# Patient Record
Sex: FEMALE | Race: WHITE | NOT HISPANIC OR LATINO | Employment: FULL TIME | URBAN - METROPOLITAN AREA
[De-identification: names, ages, dates, MRNs, and addresses within clinical notes are randomized per-mention and may not be internally consistent; named-entity substitution may affect disease eponyms.]

---

## 2017-03-13 ENCOUNTER — HOSPITAL ENCOUNTER (EMERGENCY)
Facility: MEDICAL CENTER | Age: 32
End: 2017-03-13
Attending: EMERGENCY MEDICINE
Payer: MEDICAID

## 2017-03-13 VITALS
DIASTOLIC BLOOD PRESSURE: 68 MMHG | HEART RATE: 54 BPM | SYSTOLIC BLOOD PRESSURE: 114 MMHG | OXYGEN SATURATION: 99 % | RESPIRATION RATE: 16 BRPM | TEMPERATURE: 98.3 F | BODY MASS INDEX: 34.67 KG/M2 | WEIGHT: 220.9 LBS | HEIGHT: 67 IN

## 2017-03-13 DIAGNOSIS — G43.009 NONINTRACTABLE MIGRAINE, UNSPECIFIED MIGRAINE TYPE: ICD-10-CM

## 2017-03-13 PROCEDURE — A9270 NON-COVERED ITEM OR SERVICE: HCPCS | Performed by: EMERGENCY MEDICINE

## 2017-03-13 PROCEDURE — 99284 EMERGENCY DEPT VISIT MOD MDM: CPT

## 2017-03-13 PROCEDURE — 96374 THER/PROPH/DIAG INJ IV PUSH: CPT

## 2017-03-13 PROCEDURE — 700111 HCHG RX REV CODE 636 W/ 250 OVERRIDE (IP): Performed by: EMERGENCY MEDICINE

## 2017-03-13 PROCEDURE — 96375 TX/PRO/DX INJ NEW DRUG ADDON: CPT

## 2017-03-13 PROCEDURE — 700102 HCHG RX REV CODE 250 W/ 637 OVERRIDE(OP): Performed by: EMERGENCY MEDICINE

## 2017-03-13 PROCEDURE — 700105 HCHG RX REV CODE 258: Performed by: EMERGENCY MEDICINE

## 2017-03-13 RX ORDER — SODIUM CHLORIDE 9 MG/ML
1000 INJECTION, SOLUTION INTRAVENOUS ONCE
Status: COMPLETED | OUTPATIENT
Start: 2017-03-13 | End: 2017-03-13

## 2017-03-13 RX ORDER — DIPHENHYDRAMINE HYDROCHLORIDE 50 MG/ML
25 INJECTION INTRAMUSCULAR; INTRAVENOUS ONCE
Status: COMPLETED | OUTPATIENT
Start: 2017-03-13 | End: 2017-03-13

## 2017-03-13 RX ORDER — METOCLOPRAMIDE HYDROCHLORIDE 5 MG/ML
10 INJECTION INTRAMUSCULAR; INTRAVENOUS ONCE
Status: COMPLETED | OUTPATIENT
Start: 2017-03-13 | End: 2017-03-13

## 2017-03-13 RX ORDER — ACETAMINOPHEN 325 MG/1
650 TABLET ORAL ONCE
Status: COMPLETED | OUTPATIENT
Start: 2017-03-13 | End: 2017-03-13

## 2017-03-13 RX ADMIN — ACETAMINOPHEN 650 MG: 325 TABLET, FILM COATED ORAL at 15:39

## 2017-03-13 RX ADMIN — METOCLOPRAMIDE 10 MG: 5 INJECTION, SOLUTION INTRAMUSCULAR; INTRAVENOUS at 15:39

## 2017-03-13 RX ADMIN — SODIUM CHLORIDE 1000 ML: 9 INJECTION, SOLUTION INTRAVENOUS at 15:39

## 2017-03-13 RX ADMIN — DIPHENHYDRAMINE HYDROCHLORIDE 25 MG: 50 INJECTION INTRAMUSCULAR; INTRAVENOUS at 15:39

## 2017-03-13 ASSESSMENT — PAIN SCALES - GENERAL: PAINLEVEL_OUTOF10: 2

## 2017-03-13 NOTE — ED AVS SNAPSHOT
CatchThatBus Access Code: [email or web address]  Expires: 4/12/2017  4:36 PM    Your email address is not on file at [facility].  Email Addresses are required for you to sign up for CatchThatBus, please contact [phone] to verify your personal information and to provide your email address prior to attempting to register for CatchThatBus.    Sarita THAKKAR Machias  [address]  [place] [address]    [employer]  A secure, online tool to manage your health information     [facility]’s CatchThatBus® is a secure, online tool that connects you to your personalized health information from the privacy of your home -- day or night - making it very easy for you to manage your healthcare. Once the activation process is completed, you can even access your medical information using the CatchThatBus bjorn, which is available for free in the Apple Bjorn store or Google Play store.     To learn more about CatchThatBus, visit [email or web address]    There are two levels of access available (as shown below):   My Chart Features  [facility] Primary Care Doctor [facility]  Specialists [facility]  Urgent  Care Non-[facility] Primary Care Doctor   Email your healthcare team securely and privately 24/7 X X X    Manage appointments: schedule your next appointment; view details of past/upcoming appointments X      Request prescription refills. X      View recent personal medical records, including lab and immunizations X X X X   View health record, including health history, allergies, medications X X X X   Read reports about your outpatient visits, procedures, consult and ER notes X X X X   See your discharge summary, which is a recap of your hospital and/or ER visit that includes your diagnosis, lab results, and care plan X X  X     How to register for CatchThatBus:  Once your e-mail address has been verified, follow the following steps to sign up for CatchThatBus.     1. Go to  [email or web address]  2. Click on the Sign Up Now box, which takes you to the New Member Sign Up page.  You will need to provide the following information:  a. Enter your EVIIVO Access Code exactly as it appears at the top of this page. (You will not need to use this code after you’ve completed the sign-up process. If you do not sign up before the expiration date, you must request a new code.)   b. Enter your date of birth.   c. Enter your home email address.   d. Click Submit, and follow the next screen’s instructions.  3. Create a Taplistert ID. This will be your EVIIVO login ID and cannot be changed, so think of one that is secure and easy to remember.  4. Create a EVIIVO password. You can change your password at any time.  5. Enter your Password Reset Question and Answer. This can be used at a later time if you forget your password.   6. Enter your e-mail address. This allows you to receive e-mail notifications when new information is available in EVIIVO.  7. Click Sign Up. You can now view your health information.    For assistance activating your EVIIVO account, call [phone]

## 2017-03-13 NOTE — ED NOTES
Pt AAOx4 with complaints of headache pain. No reports of difficulty breathing or SOB. No signs of distress or discomfort. Ambulatory with no assistance. Gurney in lowest position, locked, and call light within reach.

## 2017-03-13 NOTE — ED PROVIDER NOTES
ED Provider Note    CHIEF COMPLAINT  Chief Complaint   Patient presents with   • Head Ache   • Weakness       HPI  Sarita Mendoza is a 31 y.o. female who presents for evaluation of headache, light sensitivity sound sensitivity mild nausea. The patient apparently has a history of migraine-type headaches. Her psychiatrist started her on Topamax about 6 months ago and she reports that her migraine somewhat went away. She has not changed her Topamax dose recently but developed which she describes as pretty typical migraine over the last few days. Took some ibuprofen with no relief. She specifically denies any head trauma fevers chills headache numbness weakness tingling to the arms or legs or face, incontinence, or double vision. No other complaints noted       REVIEW OF SYSTEMS  See HPI for further details. No high fevers chills  Stiffness rash All other systems are negative.     PAST MEDICAL HISTORY  Past Medical History   Diagnosis Date   • Migraine        FAMILY HISTORY  No history of brain aneurysm    SOCIAL HISTORY  Social History     Social History   • Marital Status: Single     Spouse Name: N/A   • Number of Children: N/A   • Years of Education: N/A     Social History Main Topics   • Smoking status: Current Every Day Smoker -- 1.00 packs/day for 17 years     Types: Cigarettes   • Smokeless tobacco: Never Used   • Alcohol Use: No      Comment: quit   • Drug Use: No      Comment: quit   • Sexual Activity: Not Asked     Other Topics Concern   • None     Social History Narrative    ** Merged History Encounter **         smoke cigarettes denies illicit drug use    SURGICAL HISTORY  Past Surgical History   Procedure Laterality Date   • Tonsillectomy and adenoidectomy     • Primary c section       2005, 2007   • Gyn surgery       2 c sect       CURRENT MEDICATIONS    Current facility-administered medications:   •  NS infusion 1,000 mL, 1,000 mL, Intravenous, Once, Bernardino Stevens M.D.  •  metoclopramide (REGLAN)  "injection 10 mg, 10 mg, Intravenous, Once, Bernardino Stevens M.D.  •  diphenhydrAMINE (BENADRYL) injection 25 mg, 25 mg, Intravenous, Once, Bernardino Stevens M.D.  •  acetaminophen (TYLENOL) tablet 650 mg, 650 mg, Oral, Once, Bernardino Stevens M.D.  No current outpatient prescriptions on file.      ALLERGIES  Allergies   Allergen Reactions   • Penicillins Rash   • Penicillins Rash       PHYSICAL EXAM  VITAL SIGNS: /68 mmHg  Pulse 62  Temp(Src) 36.8 °C (98.3 °F)  Resp 16  Ht 1.702 m (5' 7\")  Wt 100.2 kg (220 lb 14.4 oz)  BMI 34.59 kg/m2  SpO2 99%  LMP 03/06/2017 Room air O2: 99    Constitutional: Patient appears uncomfortable mild light sensitivity sound sensitivity  HENT: Normocephalic, Atraumatic, Bilateral external ears normal, Oropharynx moist, No oral exudates, Nose normal.   Eyes: PERRLA, EOMI, Conjunctiva normal, No discharge.   Neck: Normal range of motion, No tenderness, Supple, No stridor.   Cardiovascular: Normal heart rate, Normal rhythm, No murmurs, No rubs, No gallops.   Thorax & Lungs: Normal breath sounds, No respiratory distress, No wheezing, No chest tenderness.   Abdomen: Bowel sounds normal, Soft, No tenderness, No masses, No pulsatile masses.   Skin: Warm, Dry, No erythema, No rash.   Back: No tenderness, No CVA tenderness.   Extremities: Intact distal pulses, No edema, No tenderness, No cyanosis, No clubbing.   Neurologic: Alert & oriented x 3, Normal motor function, Normal sensory function, No focal deficits noted. Cranial nerves II through XII grossly intact finger-nose testing is normal no pronator drift  Psychiatric: Affect normal, Judgment normal, Mood normal.     COURSE & MEDICAL DECISION MAKING  Pertinent Labs & Imaging studies reviewed. (See chart for details)  An IV was established. Her history and physical exam here strongly suggest migraine type headache. Without any focal neurological deficit, abrupt onset headache, hypertension evidence of either rash or neck stiffness I low " suspicion for intracranial mass, hemorrhage, meningitis. An IV was established. She was given IV fluids Reglan and adrenaline Tylenol. I observed her for about 2 hours and headache dramatically improved. I recommended that she follow up with her PCP for ongoing management. Here and prior history of such headaches and did not feel that emergent CT scan was indicated     FINAL IMPRESSION  1. Migraine headache uncomplicated      Electronically signed by: Bernardino Stevens, 3/13/2017 3:11 PM

## 2017-03-13 NOTE — ED AVS SNAPSHOT
3/13/2017          Sarita THAKKAR Mendoza  [address]  [place] [address]    Dear Sarita:    [facility] wants to ensure your discharge home is safe and you or your loved ones have had all your questions answered regarding your care after you leave the hospital.    You may receive a telephone call within two days of your discharge.  This call is to make certain you understand your discharge instructions as well as ensure we provided you with the best care possible during your stay with us.     The call will only last approximately 3-5 minutes and will be done by a nurse.    Once again, we want to ensure your discharge home is safe and that you have a clear understanding of any next steps in your care.  If you have any questions or concerns, please do not hesitate to contact us, we are here for you.  Thank you for choosing [facility] for your healthcare needs.    Sincerely,    Walter Ellington    [facility]

## 2017-03-13 NOTE — ED AVS SNAPSHOT
Home Care Instructions                                                                                                                Sarita Mendoza   MRN: [id number]    Department:  [facility], Emergency Dept   Date of Visit:  3/13/2017            [facility], Emergency Dept    [address]    [place] [address]    Phone:  [phone]      You were seen by     Bernardino Stevens M.D.      Your Diagnosis Was     Nonintractable migraine, unspecified migraine type     G43.009       These are the medications you received during your hospitalization from 03/13/2017 1318 to 03/13/2017 1636     Date/Time Order Dose Route Action    03/13/2017 1539 NS infusion 1,000 mL 1,000 mL Intravenous New Bag    03/13/2017 1539 metoclopramide (REGLAN) injection 10 mg 10 mg Intravenous Given    03/13/2017 1539 diphenhydrAMINE (BENADRYL) injection 25 mg 25 mg Intravenous Given    03/13/2017 1539 acetaminophen (TYLENOL) tablet 650 mg 650 mg Oral Given      Follow-up Information     1. Follow up with Pcp Not In Computer.    Specialty:  Family Medicine    Why:  follow-up with your regular doctor in 1-2 days if symptoms progress or worsen      Medication Information     Review all of your home medications and newly ordered medications with your primary doctor and/or pharmacist as soon as possible. Follow medication instructions as directed by your doctor and/or pharmacist.     Please keep your complete medication list with you and share with your physician. Update the information when medications are discontinued, doses are changed, or new medications (including over-the-counter products) are added; and carry medication information at all times in the event of emergency situations.               Medication List      START taking these medications        Instructions    Morning Afternoon Evening Bedtime    acetaminophen-isometheptene-dichloralphonazone -325 MG Caps   Commonly known as:   MIDRIN        Take 1 Cap by mouth every four hours as needed.   Dose:  1 Cap                          ASK your doctor about these medications        Instructions    Morning Afternoon Evening Bedtime    TOPAMAX 200 MG tablet   Generic drug:  topiramate        Take 200 mg by mouth every bedtime.   Dose:  200 mg                             Where to Get Your Medications      You can get these medications from any pharmacy     Bring a paper prescription for each of these medications    - acetaminophen-isometheptene-dichloralphonazone -325 MG Caps            Procedures and tests performed during your visit     SALINE [facility]OCK        Discharge Instructions       Recurrent Migraine Headache  A migraine headache is very bad, throbbing pain on one or both sides of your head. Recurrent migraines keep coming back. Talk to your doctor about what things may bring on (trigger) your migraine headaches.  HOME CARE  · Only take medicines as told by your doctor.  · Lie down in a dark, quiet room when you have a migraine.  · Keep a journal to find out if certain things bring on migraine headaches. For example, write down:  ¨ What you eat and drink.  ¨ How much sleep you get.  ¨ Any change to your diet or medicines.  · Lessen how much alcohol you drink.  · Quit smoking if you smoke.  · Get enough sleep.  · Lessen any stress in your life.  · Keep lights dim if bright lights bother you or make your migraines worse.  GET HELP IF:  · Medicine does not help your migraines.  · Your pain keeps coming back.  · You have a fever.  GET HELP RIGHT AWAY IF:   · Your migraine becomes really bad.  · You have a stiff neck.  · You have trouble seeing.  · Your muscles are weak, or you lose muscle control.  · You lose your balance or have trouble walking.  · You feel like you will pass out (faint), or you pass out.  · You have really bad symptoms that are different than your first symptoms.  MAKE SURE YOU:   · Understand these instructions.  · Will  watch your condition.  · Will get help right away if you are not doing well or get worse.     This information is not intended to replace advice given to you by your health care provider. Make sure you discuss any questions you have with your health care provider.     Document Released: 09/26/2009 Document Revised: 12/23/2014 Document Reviewed: 08/25/2014  [employer] Interactive Patient Education ©2016 [employer]            Patient Information     Patient Information    Following emergency treatment: all patient requiring follow-up care must return either to a private physician or a clinic if your condition worsens before you are able to obtain further medical attention, please return to the emergency room.     Billing Information    At [facility], we work to make the billing process streamlined for our patients.  Our Representatives are here to answer any questions you may have regarding your hospital bill.  If you have insurance coverage and have supplied your insurance information to us, we will submit a claim to your insurer on your behalf.  Should you have any questions regarding your bill, we can be reached online or by phone as follows:  Online: You are able pay your bills online or live chat with our representatives about any billing questions you may have. We are here to help Monday - Friday from 8:00am to 7:30pm and 9:00am - 12:00pm on Saturdays.  Please visit [email or web address]  for more information.   Phone:  [phone] or [phone]    Please note that your emergency physician, surgeon, pathologist, radiologist, anesthesiologist, and other specialists are not employed by [facility] and will therefore bill separately for their services.  Please contact them directly for any questions concerning their bills at the numbers below:     [facility] Physician Services:  [phone]  [facility]:  [phone]  [facility]:   [phone]  [facility]:  [phone]    1. Your final bill may vary from the amount quoted upon discharge if all procedures are not complete at that time, or if your doctor has additional procedures of which we are not aware. You will receive an additional bill if you return to the Emergency Department at [facility] for suture removal regardless of the facility of which the sutures were placed.     2. Please arrange for settlement of this account at the emergency registration.    3. All self-pay accounts are due in full at the time of treatment.  If you are unable to meet this obligation then payment is expected within 4-5 days.     4. If you have had radiology studies (CT, X-ray, Ultrasound, MRI), you have received a preliminary result during your emergency department visit. Please contact the radiology department [phone] to receive a copy of your final result. Please discuss the Final result with your primary physician or with the follow up physician provided.     Crisis Hotline:  [place] Crisis Hotline:  7-266-FDMKBMN or 1-152.559.5348  [place] Crisis Hotline:    1-422.377.2686 or 601-111-4518         ED Discharge Follow Up Questions    1. In order to provide you with very good care, we would like to follow up with a phone call in the next few days.  May we have your permission to contact you?     YES /  NO    2. What is the best phone number to call you? (       )_____-__________    3. What is the best time to call you?      Morning  /  Afternoon  /  Evening                   Patient Signature:  ____________________________________________________________    Date:  ____________________________________________________________      Your appointments     Mar 21, 2017  5:00 PM   CT [id number] with [facility]   [facility] [place] [facility] [place]    [address]  [place] [address]   [phone]

## 2017-03-13 NOTE — DISCHARGE INSTRUCTIONS
Recurrent Migraine Headache  A migraine headache is very bad, throbbing pain on one or both sides of your head. Recurrent migraines keep coming back. Talk to your doctor about what things may bring on (trigger) your migraine headaches.  HOME CARE  · Only take medicines as told by your doctor.  · Lie down in a dark, quiet room when you have a migraine.  · Keep a journal to find out if certain things bring on migraine headaches. For example, write down:  ¨ What you eat and drink.  ¨ How much sleep you get.  ¨ Any change to your diet or medicines.  · Lessen how much alcohol you drink.  · Quit smoking if you smoke.  · Get enough sleep.  · Lessen any stress in your life.  · Keep lights dim if bright lights bother you or make your migraines worse.  GET HELP IF:  · Medicine does not help your migraines.  · Your pain keeps coming back.  · You have a fever.  GET HELP RIGHT AWAY IF:   · Your migraine becomes really bad.  · You have a stiff neck.  · You have trouble seeing.  · Your muscles are weak, or you lose muscle control.  · You lose your balance or have trouble walking.  · You feel like you will pass out (faint), or you pass out.  · You have really bad symptoms that are different than your first symptoms.  MAKE SURE YOU:   · Understand these instructions.  · Will watch your condition.  · Will get help right away if you are not doing well or get worse.     This information is not intended to replace advice given to you by your health care provider. Make sure you discuss any questions you have with your health care provider.     Document Released: 09/26/2009 Document Revised: 12/23/2014 Document Reviewed: 08/25/2014  [employer] Interactive Patient Education ©2016 [employer]

## 2017-03-21 ENCOUNTER — HOSPITAL ENCOUNTER (OUTPATIENT)
Dept: RADIOLOGY | Facility: MEDICAL CENTER | Age: 32
End: 2017-03-21
Attending: NURSE PRACTITIONER
Payer: MEDICAID

## 2017-03-21 DIAGNOSIS — M25.561 RIGHT KNEE PAIN, UNSPECIFIED CHRONICITY: ICD-10-CM

## 2017-03-21 PROCEDURE — 73700 CT LOWER EXTREMITY W/O DYE: CPT | Mod: RT

## 2019-04-25 ENCOUNTER — NON-PROVIDER VISIT (OUTPATIENT)
Dept: URGENT CARE | Facility: CLINIC | Age: 34
End: 2019-04-25

## 2019-04-25 DIAGNOSIS — Z11.1 PPD SCREENING TEST: ICD-10-CM

## 2019-04-25 PROCEDURE — 86580 TB INTRADERMAL TEST: CPT | Performed by: PHYSICIAN ASSISTANT

## 2019-04-25 NOTE — NON-PROVIDER
Sarita Mendoza is a 33 y.o. female here for a non-provider visit for PPD placement -- Step 1 of 1    Reason for PPD:  work requirement    1. TB evaluation questionnaire completed by patient? Yes      -  If any answers marked yes did you contact a provider prior to placing? Not Indicated  2.  Patient notified to return to clinic for reading on: 04/27/19 after 1518 - 04/28/19 before 1518   3.  PPD Placement documentation completed on TB evaluation questionnaire? Yes  4.  Location of TB evaluation questionnaire filed:  PPD binder

## 2019-04-27 ENCOUNTER — NON-PROVIDER VISIT (OUTPATIENT)
Dept: URGENT CARE | Facility: CLINIC | Age: 34
End: 2019-04-27

## 2019-04-27 LAB — TB WHEAL 3D P 5 TU DIAM: NORMAL MM

## 2019-04-28 NOTE — PROGRESS NOTES
Sarita Mendoza is a 33 y.o. female here for a non-provider visit for PPD reading -- Step 1 of 2.      1.  Resulted in Epic under enter/edit results? Yes   2.  TB evaluation questionnaire scanned into chart and original given to patient?Yes      3. Was induration greater than 0 mm? No.    If Step 1 of 2, when is patient returning for second step (delete if N/A): 05/02/19 @ 4756.    Routed to PCP? No

## 2019-05-02 ENCOUNTER — NON-PROVIDER VISIT (OUTPATIENT)
Dept: URGENT CARE | Facility: CLINIC | Age: 34
End: 2019-05-02

## 2019-05-02 DIAGNOSIS — Z11.1 ENCOUNTER FOR PPD TEST: ICD-10-CM

## 2019-05-04 ENCOUNTER — NON-PROVIDER VISIT (OUTPATIENT)
Dept: URGENT CARE | Facility: CLINIC | Age: 34
End: 2019-05-04

## 2019-05-04 LAB — TB WHEAL 3D P 5 TU DIAM: NORMAL MM

## 2019-05-04 NOTE — PROGRESS NOTES
Sarita Mendoza is a 33 y.o. female here for a non-provider visit for PPD placement -- Step 2 of 2    Reason for PPD:  work requirement    1. TB evaluation questionnaire completed by patient? Yes      -  If any answers marked yes did you contact a provider prior to placing? Not Indicated  2.  Patient notified to return to clinic for reading on: 5/4/19  3.  PPD Placement documentation completed on TB evaluation questionnaire? Yes  4.  Location of TB evaluation questionnaire filed: [facility]

## 2019-05-04 NOTE — PROGRESS NOTES
Sarita Mendoza is a 33 y.o. female here for a non-provider visit for PPD reading -- Step 2 of 2.      1.  Resulted in Epic under enter/edit results? Yes   2.  TB evaluation questionnaire scanned into chart and original given to patient?Yes      3. Was induration greater than 0 mm? No.        Routed to PCP? No

## 2019-05-09 PROCEDURE — 86580 TB INTRADERMAL TEST: CPT | Performed by: PHYSICIAN ASSISTANT

## 2020-02-25 ENCOUNTER — TELEPHONE (OUTPATIENT)
Dept: SCHEDULING | Facility: IMAGING CENTER | Age: 35
End: 2020-02-25

## 2020-04-23 ENCOUNTER — NON-PROVIDER VISIT (OUTPATIENT)
Dept: URGENT CARE | Facility: CLINIC | Age: 35
End: 2020-04-23

## 2020-04-23 DIAGNOSIS — Z11.1 PPD SCREENING TEST: ICD-10-CM

## 2020-04-23 PROCEDURE — 86580 TB INTRADERMAL TEST: CPT | Performed by: PHYSICIAN ASSISTANT

## 2020-04-23 NOTE — NON-PROVIDER
Sarita Mendoza is a 34 y.o. female here for a non-provider visit for PPD placement -- Step 1 of 1    Reason for PPD:  work requirement    1. TB evaluation questionnaire completed by patient? Yes      -  If any answers marked yes did you contact a provider prior to placing? Not Indicated  2.  Patient notified to return to clinic for reading on: 04/25/2020 after 13:20 or 04/26/2020 before 13:20  3.  PPD Placement documentation completed on TB evaluation questionnaire? Yes  4.  Location of TB evaluation questionnaire filed: the

## 2020-04-25 ENCOUNTER — NON-PROVIDER VISIT (OUTPATIENT)
Dept: URGENT CARE | Facility: CLINIC | Age: 35
End: 2020-04-25
Payer: COMMERCIAL

## 2020-04-25 LAB — TB WHEAL 3D P 5 TU DIAM: 0 MM

## 2020-04-25 NOTE — NON-PROVIDER
Sarita Mendoza is a 34 y.o. female here for a non-provider visit for PPD reading -- Step 1 of 1.      1.  Resulted in Epic under enter/edit results? Yes   2.  TB evaluation questionnaire scanned into chart and original given to patient?Yes      3. Was induration greater than 0 mm? No.      Routed to PCP? No

## 2020-05-19 ENCOUNTER — TELEMEDICINE (OUTPATIENT)
Dept: MEDICAL GROUP | Facility: MEDICAL CENTER | Age: 35
End: 2020-05-19
Payer: COMMERCIAL

## 2020-05-19 VITALS — TEMPERATURE: 97.9 F | HEIGHT: 67 IN | BODY MASS INDEX: 29.82 KG/M2 | WEIGHT: 190 LBS

## 2020-05-19 DIAGNOSIS — Z11.3 SCREEN FOR STD (SEXUALLY TRANSMITTED DISEASE): ICD-10-CM

## 2020-05-19 DIAGNOSIS — R87.618 PAP SMEAR ABNORMALITY OF CERVIX/HUMAN PAPILLOMAVIRUS (HPV) POSITIVE: ICD-10-CM

## 2020-05-19 DIAGNOSIS — F31.78 BIPOLAR DISORDER, IN FULL REMISSION, MOST RECENT EPISODE MIXED (HCC): ICD-10-CM

## 2020-05-19 DIAGNOSIS — F15.11 HISTORY OF METHAMPHETAMINE ABUSE (HCC): ICD-10-CM

## 2020-05-19 PROCEDURE — 99203 OFFICE O/P NEW LOW 30 MIN: CPT | Performed by: FAMILY MEDICINE

## 2020-05-19 NOTE — PROGRESS NOTES
Telemedicine Visit: New Patient     This encounter was conducted via [employer].   Verbal consent was obtained. Patient's identity was verified.    Subjective:     CC:   Sarita Mendoza is a 34 y.o. female presenting to establish care and to discuss the evaluation and management of    History of methamphetamine abuse (HCC)  The patient reports multiple years of daily methamphetamine use. She was sober from methamphetamine for nine years, relapsed for one year, and has been sober for three years. She now works for the [employer] - the program she completed to help her achieve sobriety.     Bipolar disorder, in full remission, most recent episode mixed (HCC)  This is a chronic problem for which the patient follows with psychiatry - Dr. Olimpia Chun. She reports she is stable on topiramate 125mg QHS.    Pap smear abnormality of cervix/human papillomavirus (HPV) positive  The patient reports a history of positive high risk HPV on previous pap smear in 2017 or 2018. She had a colposcopy but did not follow up s/p colposcopy. She is unsure of the physician who completed her colposcopy.      ROS  See HPI  Constitutional: Negative for fever, chills and malaise/fatigue.   HENT: Negative for congestion.    Eyes: Negative for pain.   Respiratory: Negative for cough and shortness of breath.    Cardiovascular: Negative for leg swelling.   Gastrointestinal: Negative for nausea, vomiting, abdominal pain and diarrhea.   Genitourinary: Negative for dysuria and hematuria.   Skin: Negative for rash.   Neurological: Negative for dizziness, focal weakness and headaches.   Endo/Heme/Allergies: Does not bruise/bleed easily.   Psychiatric/Behavioral: H/o bipolar disorder per above; patient's symptoms well-controlled    Allergies   Allergen Reactions   • Penicillins Rash   • Penicillins Rash       Current medicines (including changes today)  Current Outpatient Medications   Medication Sig Dispense Refill   • topiramate (TOPAMAX) 200  "MG tablet Take 125 mg by mouth every bedtime.       No current facility-administered medications for this visit.        She  has a past medical history of Migraine.  She  has a past surgical history that includes tonsillectomy and adenoidectomy; primary c section; and gyn surgery.      Family History   Problem Relation Age of Onset   • No Known Problems Mother    • No Known Problems Father      Family Status   Relation Name Status   • Mo  Alive   • Fa  Alive       Patient Active Problem List    Diagnosis Date Noted   • History of methamphetamine abuse (HCC) 05/19/2020   • Bipolar disorder, in full remission, most recent episode mixed (HCC) 05/19/2020   • Pap smear abnormality of cervix/human papillomavirus (HPV) positive 05/19/2020          Objective:   Vitals obtained by patient:  Temp 36.6 °C (97.9 °F) (Oral)   Ht 1.702 m (5' 7\")   Wt 86.2 kg (190 lb)   LMP 05/16/2020   BMI 29.76 kg/m²     Physical Exam:  Constitutional: Alert, no distress, well-groomed.  Skin: No rashes in visible areas.  Eye: Round. Conjunctiva clear, lids normal. No icterus.   ENMT: Lips pink without lesions, good dentition, moist mucous membranes. Phonation normal.  CV: Pulse as reported by patient  Respiratory: Unlabored respiratory effort, no cough or audible wheeze  Psych: Alert and oriented x3, normal affect and mood.       Assessment and Plan:   The following treatment plan was discussed:     1. History of methamphetamine abuse (HCC)  The patient has been sober from meth X 3 years; works for the [employer] to help others achieve sobriety.  - Patient currently has many resources and is actively engaged in a sobriety program; she will notify should she need further resources/referrals    2. Bipolar disorder, in full remission, most recent episode mixed (HCC)  This is a chronic problem for which the patient follows with psychiatry; her bipolar is in remission and stable on topiramate 125mg daily  - Continue current regimen  - " Comp Metabolic Panel; Future  - CBC WITH DIFFERENTIAL; Future    3. Screen for STD (sexually transmitted disease)  - Chlamydia/GC PCR Urine Or Swab; Future  - HIV AG/AB COMBO ASSAY SCREENING; Future  - T.PALLIDUM AB EIA; Future    4. Pap smear abnormality of cervix/human papillomavirus (HPV) positive  The patient reports abnormal pap 2-3 years ago. She had a colposcopy however has not followed up since.  - Requesting records  - Will schedule for pap    5. BMI 29.0-29.9,adult  Patient is working on improving her diet.  - Comp Metabolic Panel; Future  - HEMOGLOBIN A1C; Future  - Lipid Profile; Future  - CBC WITH DIFFERENTIAL; Future  - TSH WITH REFLEX TO FT4; Future        Follow-up: Return in about 1 month (around 6/19/2020) for Pap.       Please note this dictation was created using voice recognition software. I have made every reasonable attempt to correct obvious errors, but I expect there may be errors of grammar, and possibly content, that I did not discover before finalizing the note.

## 2020-05-19 NOTE — ASSESSMENT & PLAN NOTE
The patient reports a history of positive high risk HPV on previous pap smear in 2017 or 2018. She had a colposcopy but did not follow up s/p colposcopy. She is unsure of the physician who completed her colposcopy.

## 2020-05-19 NOTE — ASSESSMENT & PLAN NOTE
This is a chronic problem for which the patient follows with psychiatry - Dr. Olimpia Chun. She reports she is stable on topiramate 125mg QHS.

## 2020-05-19 NOTE — ASSESSMENT & PLAN NOTE
The patient reports multiple years of daily methamphetamine use. She was sober from methamphetamine for nine years, relapsed for one year, and has been sober for three years. She now works for the [employer] - the program she completed to help her achieve sobriety.

## 2020-05-20 ENCOUNTER — TELEPHONE (OUTPATIENT)
Dept: MEDICAL GROUP | Facility: MEDICAL CENTER | Age: 35
End: 2020-05-20

## 2020-05-20 NOTE — LETTER
[facility]  Mary Ackerman M.D.  [address]  [place] [address]  Fax: [phone]   Authorization for Release/Disclosure of   Protected Health Information   Name: SARITA FALLON : 1985 SSN: [id number]   Address: [address] [place] [address] [address] Jamel TELLES  [facility] [address] Phone:    [phone] (home)    I authorize the entity listed below to release/disclose the PHI below to:   [facility]/Mary Ackerman M.D. and Mary Ackerman M.D.   Provider or Entity Name:Dr. Janak Pacheco     Address   City, State, Zip   Phone:[phone]      Fax:[phone]     Reason for request: continuity of care   Information to be released:    [  ] LAST COLONOSCOPY,  including any PATH REPORT and follow-up  [  ] LAST FIT/COLOGUARD RESULT [  ] LAST DEXA  [  ] LAST MAMMOGRAM  [ xxx ] LAST PAP  [ xxx ] LAST LABS [  ] RETINA EXAM REPORT  [  ] IMMUNIZATION RECORDS  [  ] Release all info      [  ] Check here and initial the line next to each item to release ALL health information INCLUDING  _____ Care and treatment for drug and / or alcohol abuse  _____ HIV testing, infection status, or AIDS  _____ Genetic Testing    DATES OF SERVICE OR TIME PERIOD TO BE DISCLOSED: _____________  I understand and acknowledge that:  * This Authorization may be revoked at any time by you in writing, except if your health information has already been used or disclosed.  * Your health information that will be used or disclosed as a result of you signing this authorization could be re-disclosed by the recipient. If this occurs, your re-disclosed health information may no longer be protected by State or Federal laws.  * You may refuse to sign this Authorization. Your refusal will not affect your ability to obtain treatment.  * This Authorization becomes effective upon signing and will  on (date) __________.      If no date is indicated, this Authorization will  one (1) year from the signature date.    Name: Sarita Nixon  Mendoza    Signature:Continuation of Care   Date:     5/20/2020       PLEASE FAX REQUESTED RECORDS BACK TO: [phone]

## 2020-05-20 NOTE — TELEPHONE ENCOUNTER
----- Message from Mary Ackerman M.D. sent at 5/19/2020  2:16 PM PDT -----  Regarding: FW: Doctors Name   Contact: [phone]  Can we please get records for her please?  ----- Message -----  From: Cherie Hassan  Sent: 5/19/2020  10:19 AM PDT  To: Mary Ackerman M.D.  Subject: FW: Doctors Name                                   ----- Message -----  From: Sarita Mendoza  Sent: 5/19/2020   8:53 AM PDT  To: Mitchell All Mas  Subject: Doctors Name                                     Topic: Bill/Statement    Hi, I found the gynecologist name that I had been seeing in the past. His name is Dr. Janak Pacheco. I thought I would let you guys know. I will still try and get my records over to you guys.     Thank You,   Sarita Mendoza

## 2020-06-10 ENCOUNTER — HOSPITAL ENCOUNTER (OUTPATIENT)
Dept: LAB | Facility: MEDICAL CENTER | Age: 35
End: 2020-06-10
Attending: FAMILY MEDICINE
Payer: COMMERCIAL

## 2020-06-10 DIAGNOSIS — Z11.3 SCREEN FOR STD (SEXUALLY TRANSMITTED DISEASE): ICD-10-CM

## 2020-06-10 DIAGNOSIS — F31.78 BIPOLAR DISORDER, IN FULL REMISSION, MOST RECENT EPISODE MIXED (HCC): ICD-10-CM

## 2020-06-10 LAB
ALBUMIN SERPL BCP-MCNC: 4.6 G/DL (ref 3.2–4.9)
ALBUMIN/GLOB SERPL: 1.8 G/DL
ALP SERPL-CCNC: 53 U/L (ref 30–99)
ALT SERPL-CCNC: 13 U/L (ref 2–50)
ANION GAP SERPL CALC-SCNC: 12 MMOL/L (ref 7–16)
AST SERPL-CCNC: 14 U/L (ref 12–45)
BASOPHILS # BLD AUTO: 0.7 % (ref 0–1.8)
BASOPHILS # BLD: 0.05 K/UL (ref 0–0.12)
BILIRUB SERPL-MCNC: 0.6 MG/DL (ref 0.1–1.5)
BUN SERPL-MCNC: 13 MG/DL (ref 8–22)
CALCIUM SERPL-MCNC: 8.8 MG/DL (ref 8.5–10.5)
CHLORIDE SERPL-SCNC: 107 MMOL/L (ref 96–112)
CHOLEST SERPL-MCNC: 124 MG/DL (ref 100–199)
CO2 SERPL-SCNC: 20 MMOL/L (ref 20–33)
CREAT SERPL-MCNC: 0.6 MG/DL (ref 0.5–1.4)
EOSINOPHIL # BLD AUTO: 0.11 K/UL (ref 0–0.51)
EOSINOPHIL NFR BLD: 1.6 % (ref 0–6.9)
ERYTHROCYTE [DISTWIDTH] IN BLOOD BY AUTOMATED COUNT: 42.4 FL (ref 35.9–50)
EST. AVERAGE GLUCOSE BLD GHB EST-MCNC: 97 MG/DL
FASTING STATUS PATIENT QL REPORTED: NORMAL
GLOBULIN SER CALC-MCNC: 2.6 G/DL (ref 1.9–3.5)
GLUCOSE SERPL-MCNC: 80 MG/DL (ref 65–99)
HBA1C MFR BLD: 5 % (ref 0–5.6)
HCT VFR BLD AUTO: 43 % (ref 37–47)
HDLC SERPL-MCNC: 45 MG/DL
HGB BLD-MCNC: 14 G/DL (ref 12–16)
IMM GRANULOCYTES # BLD AUTO: 0.01 K/UL (ref 0–0.11)
IMM GRANULOCYTES NFR BLD AUTO: 0.1 % (ref 0–0.9)
LDLC SERPL CALC-MCNC: 70 MG/DL
LYMPHOCYTES # BLD AUTO: 2.27 K/UL (ref 1–4.8)
LYMPHOCYTES NFR BLD: 32.7 % (ref 22–41)
MCH RBC QN AUTO: 30.4 PG (ref 27–33)
MCHC RBC AUTO-ENTMCNC: 32.6 G/DL (ref 33.6–35)
MCV RBC AUTO: 93.5 FL (ref 81.4–97.8)
MONOCYTES # BLD AUTO: 0.36 K/UL (ref 0–0.85)
MONOCYTES NFR BLD AUTO: 5.2 % (ref 0–13.4)
NEUTROPHILS # BLD AUTO: 4.14 K/UL (ref 2–7.15)
NEUTROPHILS NFR BLD: 59.7 % (ref 44–72)
NRBC # BLD AUTO: 0 K/UL
NRBC BLD-RTO: 0 /100 WBC
PLATELET # BLD AUTO: 327 K/UL (ref 164–446)
PMV BLD AUTO: 11 FL (ref 9–12.9)
POTASSIUM SERPL-SCNC: 3.7 MMOL/L (ref 3.6–5.5)
PROT SERPL-MCNC: 7.2 G/DL (ref 6–8.2)
RBC # BLD AUTO: 4.6 M/UL (ref 4.2–5.4)
SODIUM SERPL-SCNC: 139 MMOL/L (ref 135–145)
TRIGL SERPL-MCNC: 47 MG/DL (ref 0–149)
WBC # BLD AUTO: 6.9 K/UL (ref 4.8–10.8)

## 2020-06-10 PROCEDURE — 87491 CHLMYD TRACH DNA AMP PROBE: CPT

## 2020-06-10 PROCEDURE — 87591 N.GONORRHOEAE DNA AMP PROB: CPT

## 2020-06-10 PROCEDURE — 87389 HIV-1 AG W/HIV-1&-2 AB AG IA: CPT

## 2020-06-10 PROCEDURE — 86780 TREPONEMA PALLIDUM: CPT

## 2020-06-10 PROCEDURE — 80061 LIPID PANEL: CPT

## 2020-06-10 PROCEDURE — 84443 ASSAY THYROID STIM HORMONE: CPT

## 2020-06-10 PROCEDURE — 83036 HEMOGLOBIN GLYCOSYLATED A1C: CPT

## 2020-06-10 PROCEDURE — 85025 COMPLETE CBC W/AUTO DIFF WBC: CPT

## 2020-06-10 PROCEDURE — 80053 COMPREHEN METABOLIC PANEL: CPT

## 2020-06-10 PROCEDURE — 36415 COLL VENOUS BLD VENIPUNCTURE: CPT

## 2020-06-11 LAB
C TRACH DNA SPEC QL NAA+PROBE: NEGATIVE
HIV 1+2 AB+HIV1 P24 AG SERPL QL IA: NORMAL
N GONORRHOEA DNA SPEC QL NAA+PROBE: NEGATIVE
SPECIMEN SOURCE: NORMAL
TREPONEMA PALLIDUM IGG+IGM AB [PRESENCE] IN SERUM OR PLASMA BY IMMUNOASSAY: NORMAL
TSH SERPL DL<=0.005 MIU/L-ACNC: 1.97 UIU/ML (ref 0.38–5.33)

## 2020-12-11 ENCOUNTER — OFFICE VISIT (OUTPATIENT)
Dept: MEDICAL GROUP | Facility: MEDICAL CENTER | Age: 35
End: 2020-12-11
Payer: COMMERCIAL

## 2020-12-11 VITALS
DIASTOLIC BLOOD PRESSURE: 66 MMHG | BODY MASS INDEX: 29.57 KG/M2 | RESPIRATION RATE: 16 BRPM | WEIGHT: 188.4 LBS | HEIGHT: 67 IN | HEART RATE: 88 BPM | TEMPERATURE: 99.2 F | SYSTOLIC BLOOD PRESSURE: 102 MMHG | OXYGEN SATURATION: 92 %

## 2020-12-11 DIAGNOSIS — F15.11 HISTORY OF METHAMPHETAMINE ABUSE (HCC): ICD-10-CM

## 2020-12-11 DIAGNOSIS — F31.78 BIPOLAR DISORDER, IN FULL REMISSION, MOST RECENT EPISODE MIXED (HCC): ICD-10-CM

## 2020-12-11 DIAGNOSIS — L73.2 HIDRADENITIS AXILLARIS: ICD-10-CM

## 2020-12-11 DIAGNOSIS — R87.618 PAP SMEAR ABNORMALITY OF CERVIX/HUMAN PAPILLOMAVIRUS (HPV) POSITIVE: ICD-10-CM

## 2020-12-11 PROCEDURE — 99214 OFFICE O/P EST MOD 30 MIN: CPT | Performed by: FAMILY MEDICINE

## 2020-12-11 RX ORDER — TOPIRAMATE 100 MG/1
TABLET, FILM COATED ORAL
COMMUNITY
Start: 2020-11-27 | End: 2022-04-19

## 2020-12-11 RX ORDER — DOXYCYCLINE HYCLATE 100 MG
100 TABLET ORAL 2 TIMES DAILY
Qty: 14 TAB | Refills: 1 | Status: SHIPPED | OUTPATIENT
Start: 2020-12-11 | End: 2020-12-18

## 2020-12-11 RX ORDER — CLINDAMYCIN PHOSPHATE 10 MG/G
GEL TOPICAL
Qty: 60 G | Refills: 6 | Status: SHIPPED | OUTPATIENT
Start: 2020-12-11 | End: 2021-02-23

## 2020-12-11 ASSESSMENT — FIBROSIS 4 INDEX: FIB4 SCORE: 0.43

## 2020-12-11 NOTE — ASSESSMENT & PLAN NOTE
This is a chronic problem for which the patient follows with psychiatry - Dr. Olimpia Chun. She reports she is stable on topiramate 100mg QHS.

## 2020-12-11 NOTE — PROGRESS NOTES
Subjective:     CC: painful axillary bumps    HPI:   Sarita presents today with:    Hidradenitis axillaris  The patient reports bilateral, red, painful lumps in her axilla which started about a week ago. She reports her symptoms are improving and almost resolved. Patient reports the bumps looked like infected hair follicles. She reports similar in her groin area previously. She has never had any treatment.        Bipolar disorder, in full remission, most recent episode mixed (HCC)  This is a chronic problem for which the patient follows with psychiatry - Dr. Olimpia Chun. She reports she is stable on topiramate 100mg QHS.    History of methamphetamine abuse (HCC)  The patient reports multiple years of daily methamphetamine use. She was sober from methamphetamine for nine years, relapsed for one year, and has been sober for three years. She now works for the [employer] - the program she completed to help her achieve sobriety.     Pap smear abnormality of cervix/human papillomavirus (HPV) positive  The patient reports a history of positive high risk HPV on previous pap smear in 2017 or 2018. She had a colposcopy but did not follow up s/p colposcopy. She is unsure of the physician who completed her colposcopy. Patient scheduled for pap 1/15/2021.      Past Medical History:   Diagnosis Date   • Migraine        Social History     Tobacco Use   • Smoking status: Former Smoker     Packs/day: 1.00     Years: 17.00     Pack years: 17.00     Types: Cigarettes   • Smokeless tobacco: Never Used   Substance Use Topics   • Alcohol use: No     Comment: quit   • Drug use: Not Currently     Comment: clean for 3 1/2 years       Current Outpatient Medications Ordered in Epic   Medication Sig Dispense Refill   • MELATONIN PO Take  by mouth.     • Ascorbic Acid (VITAMIN C PO) Take  by mouth.     • clindamycin (CLEOCIN T) 1 % Gel Apply thin layer to affected area twice daily 60 g 6   • doxycycline (VIBRAMYCIN) 100 MG Tab Take 1  "Tab by mouth 2 times a day for 7 days. 14 Tab 1   • topiramate (TOPAMAX) 100 MG Tab Take  by mouth. Take 1 tablet by mouth every night at bedtime       No current Epic-ordered facility-administered medications on file.        Allergies:  Penicillins and Penicillins    Health Maintenance: Patient due for pap; declines flu shot    ROS:  Gen: no fevers/chills, no changes in weight  Eyes: no changes in vision  ENT: no sore throat, no hearing loss, no bloody nose  Pulm: no SOB  CV: no chest pain    Objective:       Exam:  /66 (BP Location: Left arm, Patient Position: Sitting, BP Cuff Size: Adult)   Pulse 88   Temp 37.3 °C (99.2 °F) (Temporal)   Resp 16   Ht 1.702 m (5' 7\")   Wt 85.5 kg (188 lb 6.4 oz)   LMP  (LMP Unknown)   SpO2 92%   BMI 29.51 kg/m²  Body mass index is 29.51 kg/m².    Constitutional: Alert, no distress, well-groomed  Skin: Warm, dry, good turgor, left axilla - two 2X2mm firm, superficial mass, mild erythema, no drainage; right axilla - one 3X3mm firm, superficial, tender mass mild erythema, no drainage  Eyes: Equal, round and reactive, conjunctiva clear, no ptosis  ENMT: Lips without lesions, good dentition, moist mucous membranes  Neck: Trachea midline, no obvious thyromegaly  Respiratory: Unlabored respiratory effort  Neuro: Grossly non-focal  Psych: Alert and oriented, normal affect and mood      Assessment & Plan:     35 y.o. female with the following -     1. Hidradenitis axillaris  - clindamycin (CLEOCIN T) 1 % Gel; Apply thin layer to affected area twice daily  Dispense: 60 g; Refill: 6  - doxycycline (VIBRAMYCIN) 100 MG Tab; Take 1 Tab by mouth 2 times a day for 7 days.  Dispense: 14 Tab; Refill: 1    2. Bipolar disorder, in full remission, most recent episode mixed (HCC)  - Continue topamax 100mg QHS per psychiatry    3. History of methamphetamine abuse (HCC)  - Encouraged patient to continue sobriety    4. Pap smear abnormality of cervix/human papillomavirus (HPV) positive  - " Scheduled pap for 1/15/2021    Return in about 1 month (around 1/11/2021) for F/U lab.    Please note this dictation was created using voice recognition software. I have made every reasonable attempt to correct obvious errors, but I expect there may be errors of grammar, and possibly content, that I did not discover before finalizing the note.

## 2020-12-11 NOTE — ASSESSMENT & PLAN NOTE
The patient reports bilateral, red, painful lumps in her axilla which started about a week ago. She reports her symptoms are improving and almost resolved. Patient reports the bumps looked like infected hair follicles. She reports similar in her groin area previously. She has never had any treatment.

## 2020-12-11 NOTE — ASSESSMENT & PLAN NOTE
The patient reports a history of positive high risk HPV on previous pap smear in 2017 or 2018. She had a colposcopy but did not follow up s/p colposcopy. She is unsure of the physician who completed her colposcopy. Patient scheduled for pap 1/15/2021.

## 2021-01-15 ENCOUNTER — HOSPITAL ENCOUNTER (OUTPATIENT)
Facility: MEDICAL CENTER | Age: 36
End: 2021-01-15
Attending: FAMILY MEDICINE
Payer: COMMERCIAL

## 2021-01-15 ENCOUNTER — OFFICE VISIT (OUTPATIENT)
Dept: MEDICAL GROUP | Facility: MEDICAL CENTER | Age: 36
End: 2021-01-15
Payer: COMMERCIAL

## 2021-01-15 VITALS
OXYGEN SATURATION: 98 % | TEMPERATURE: 99.4 F | DIASTOLIC BLOOD PRESSURE: 82 MMHG | HEIGHT: 67 IN | RESPIRATION RATE: 16 BRPM | SYSTOLIC BLOOD PRESSURE: 132 MMHG | BODY MASS INDEX: 29.86 KG/M2 | HEART RATE: 92 BPM | WEIGHT: 190.26 LBS

## 2021-01-15 DIAGNOSIS — Z11.51 SCREENING FOR HPV (HUMAN PAPILLOMAVIRUS): ICD-10-CM

## 2021-01-15 DIAGNOSIS — R87.618 PAP SMEAR ABNORMALITY OF CERVIX/HUMAN PAPILLOMAVIRUS (HPV) POSITIVE: ICD-10-CM

## 2021-01-15 DIAGNOSIS — N89.8 VAGINAL ODOR: ICD-10-CM

## 2021-01-15 DIAGNOSIS — Z01.419 WELL WOMAN EXAM: ICD-10-CM

## 2021-01-15 DIAGNOSIS — Z12.4 SCREENING FOR CERVICAL CANCER: ICD-10-CM

## 2021-01-15 PROCEDURE — 88175 CYTOPATH C/V AUTO FLUID REDO: CPT

## 2021-01-15 PROCEDURE — 99395 PREV VISIT EST AGE 18-39: CPT | Performed by: FAMILY MEDICINE

## 2021-01-15 PROCEDURE — 87660 TRICHOMONAS VAGIN DIR PROBE: CPT

## 2021-01-15 PROCEDURE — 87480 CANDIDA DNA DIR PROBE: CPT

## 2021-01-15 PROCEDURE — 87510 GARDNER VAG DNA DIR PROBE: CPT

## 2021-01-15 PROCEDURE — 99000 SPECIMEN HANDLING OFFICE-LAB: CPT | Performed by: FAMILY MEDICINE

## 2021-01-15 PROCEDURE — 87624 HPV HI-RISK TYP POOLED RSLT: CPT

## 2021-01-15 ASSESSMENT — FIBROSIS 4 INDEX: FIB4 SCORE: 0.43

## 2021-01-15 NOTE — PROGRESS NOTES
Subjective:     CC:   Chief Complaint   Patient presents with   • Gynecologic Exam       HPI:   Sarita Mendoza is a 35 y.o. female who presents for annual exam. She is feeling well and denies any complaints at this time. She does note a chronic vaginal odor and would like testing for BV today.    Ob-Gyn/ History:    Patient has GYN provider: no  /Para:    Last Pap Smear:  . Positive history of abnormal pap smears (HPV positive, colposcopy completed , no follow up since then).  Gyn Surgery:  CS X 3.  Current Contraceptive Method:  Paragard placed . She is not currently sexually active.  Last menstrual period:  December.  Periods regular. moderate bleeding. Cramping is mild.   She does take OTC analgesics for cramps.  No significant bloating/fluid retention, pelvic pain, or dyspareunia. No vaginal discharge  Folate intake: Discussed CDC recommendation for 0.4-0.8mcg folic acid daily for women of child-bearing age     Health Maintenance  PT/vit D for falls prevention: Patient reports diet adequate in vit D, daily weight bearing exercise   Cholesterol Screening: Completed 2020   Diabetes Screening: Completed 2020   Aspirin Use: NA    Diet: Patient reports healthy diet, needs to cut down on sugar   Exercise: Patient exercising a couple times weekly   Substance Abuse: None, in remission   Seat belts, bike helmet, gun safety discussed.  Sun protection used.    Cancer screening  Colorectal Cancer Screening: Complete age 49    Lung Cancer Screening: NA    Cervical Cancer Screening: Completed today   Breast Cancer Screening: Complete age 38yo     Infectious disease screening/Immunizations  --STI Screening: Completed 20   --Practices safe sex.  --HIV Screening: Completed 2020   --Hepatitis C Screening: NA  --Immunizations: UTD other than flu   Influenza: Patient declines       She  has a past medical history of Allergy, Anxiety, Migraine, and Substance abuse (HCC).  She  has a past  surgical history that includes tonsillectomy and adenoidectomy; primary c section; and gyn surgery.    Family History   Problem Relation Age of Onset   • Migraines Mother    • No Known Problems Father    • Diabetes Maternal Grandmother        Social History     Socioeconomic History   • Marital status: Single     Spouse name: Not on file   • Number of children: Not on file   • Years of education: Not on file   • Highest education level: Not on file   Occupational History   • Not on file   Social Needs   • Financial resource strain: Not on file   • Food insecurity     Worry: Not on file     Inability: Not on file   • Transportation needs     Medical: Not on file     Non-medical: Not on file   Tobacco Use   • Smoking status: Former Smoker     Packs/day: 1.00     Years: 17.00     Pack years: 17.00     Types: Cigarettes   • Smokeless tobacco: Never Used   Substance and Sexual Activity   • Alcohol use: No     Comment: quit   • Drug use: Not Currently     Comment: clean for 3 1/2 years   • Sexual activity: Not Currently     Partners: Male     Birth control/protection: I.U.D.   Lifestyle   • Physical activity     Days per week: Not on file     Minutes per session: Not on file   • Stress: Not on file   Relationships   • Social connections     Talks on phone: Not on file     Gets together: Not on file     Attends Voodoo service: Not on file     Active member of club or organization: Not on file     Attends meetings of clubs or organizations: Not on file     Relationship status: Not on file   • Intimate partner violence     Fear of current or ex partner: Not on file     Emotionally abused: Not on file     Physically abused: Not on file     Forced sexual activity: Not on file   Other Topics Concern   • Not on file   Social History Narrative    ** Merged History Encounter **            Patient Active Problem List    Diagnosis Date Noted   • Hidradenitis axillaris 12/11/2020   • History of methamphetamine abuse (HCC)  "05/19/2020   • Bipolar disorder, in full remission, most recent episode mixed (HCC) 05/19/2020   • Pap smear abnormality of cervix/human papillomavirus (HPV) positive 05/19/2020         Current Outpatient Medications   Medication Sig Dispense Refill   • VITAMIN D PO Take  by mouth.     • MELATONIN PO Take  by mouth.     • Ascorbic Acid (VITAMIN C PO) Take  by mouth.     • clindamycin (CLEOCIN T) 1 % Gel Apply thin layer to affected area twice daily 60 g 6   • topiramate (TOPAMAX) 100 MG Tab Take  by mouth. Take 1 tablet by mouth every night at bedtime       No current facility-administered medications for this visit.      Allergies   Allergen Reactions   • Penicillins Rash   • Penicillins Rash       Review of Systems   Constitutional: Negative for fever and chills  HENT: Negative for congestion.    Eyes: Negative for blurry vision.  Respiratory: Negative for cough and shortness of breath.    Cardiovascular: Negative for chest pain and leg swelling.   Gastrointestinal: Negative for nausea, vomiting, abdominal pain and diarrhea.   Genitourinary: Negative for dysuria and hematuria.   Skin: Negative for rash.   Neurological: Negative for dizziness, focal weakness and headaches.   Heme: Does not bruise/bleed easily.   Psychiatric/Behavioral: Negative for depression.  The patient is not nervous/anxious.      Objective:     /82 (BP Location: Left arm, Patient Position: Sitting, BP Cuff Size: Adult)   Pulse 92   Temp 37.4 °C (99.4 °F) (Temporal)   Resp 16   Ht 1.702 m (5' 7\")   Wt 86.3 kg (190 lb 4.1 oz)   LMP  (LMP Unknown)   SpO2 98%   BMI 29.80 kg/m²   Body mass index is 29.8 kg/m².  Wt Readings from Last 4 Encounters:   01/15/21 86.3 kg (190 lb 4.1 oz)   12/11/20 85.5 kg (188 lb 6.4 oz)   05/19/20 86.2 kg (190 lb)   03/13/17 100.2 kg (220 lb 14.4 oz)       Physical Exam:  Constitutional: Well-developed, well-nourished. Appears staged age.   Skin: Skin is warm and dry. No rash noted.  Head: Atraumatic " without obvious lesions.  Eyes: Conjunctivae and extraocular motions intact. Pupils are equal, round, and reactive to light. No scleral icterus.   Ears:  External ears unremarkable. Tympanic membranes clear and intact.  Nose: Nares patent. Septum midline. Turbinates without erythema nor edema. No discharge.   Neck: Supple, trachea midline. Normal range of motion. No thyromegaly present. No lymphadenopathy--cervical or supraclavicular.  Cardiovascular: Regular rate and rhythm, S1 and S2 without murmur, rubs, or gallops.  Lungs: Normal inspiratory effort, CTA bilaterally, no wheezes/rhonchi/rales  Abdomen: Soft, non tender, and without distention. Active bowel sounds. No rebound or guarding.  :Perineum and external genitalia normal without rash. Vagina with normal and physiologic discharge. Cervix without visible lesions or discharge.   Extremities: No cyanosis, clubbing, erythema, nor edema. Distal pulses intact and symmetric.   Neurological: Alert and oriented x 3.   Psychiatric:  Behavior, euthymic affect        Assessment and Plan:     1. Well woman exam  HCM:  Pap completed today; will plan for routine screening for breast and colon cancer  Labs: Completed 6/20/2020  Immunizations: patient declines flu, otherwise UTD  Patient counseled about skin care, diet, exercise, supplements, and gun safety.    2. Screening for cervical cancer  - THINPREP PAP WITH HPV; Future    3. Screening for HPV (human papillomavirus)  - THINPREP PAP WITH HPV; Future    4. Pap smear abnormality of cervix/human papillomavirus (HPV) positive  - THINPREP PAP WITH HPV; Future    5. Vaginal odor  - VAGINAL PATHOGENS DNA PANEL; Future    Other orders  - VITAMIN D PO; Take  by mouth.    Follow-up: Return in about 6 months (around 7/15/2021).

## 2021-01-16 LAB
CANDIDA DNA VAG QL PROBE+SIG AMP: NEGATIVE
G VAGINALIS DNA VAG QL PROBE+SIG AMP: POSITIVE
T VAGINALIS DNA VAG QL PROBE+SIG AMP: POSITIVE

## 2021-01-18 ENCOUNTER — TELEPHONE (OUTPATIENT)
Dept: MEDICAL GROUP | Facility: MEDICAL CENTER | Age: 36
End: 2021-01-18

## 2021-01-18 LAB
CYTOLOGY REG CYTOL: NORMAL
HPV HR 12 DNA CVX QL NAA+PROBE: NEGATIVE
HPV16 DNA SPEC QL NAA+PROBE: NEGATIVE
HPV18 DNA SPEC QL NAA+PROBE: NEGATIVE
SPECIMEN SOURCE: NORMAL

## 2021-01-19 ENCOUNTER — TELEMEDICINE (OUTPATIENT)
Dept: MEDICAL GROUP | Facility: MEDICAL CENTER | Age: 36
End: 2021-01-19
Payer: COMMERCIAL

## 2021-01-19 ENCOUNTER — HOSPITAL ENCOUNTER (OUTPATIENT)
Dept: LAB | Facility: MEDICAL CENTER | Age: 36
End: 2021-01-19
Attending: FAMILY MEDICINE
Payer: COMMERCIAL

## 2021-01-19 VITALS — WEIGHT: 185 LBS | HEIGHT: 67 IN | BODY MASS INDEX: 29.03 KG/M2

## 2021-01-19 DIAGNOSIS — A59.01 TRICHOMONAS VAGINITIS: ICD-10-CM

## 2021-01-19 DIAGNOSIS — N76.0 BACTERIAL VAGINOSIS: ICD-10-CM

## 2021-01-19 DIAGNOSIS — B96.89 BACTERIAL VAGINOSIS: ICD-10-CM

## 2021-01-19 PROBLEM — R87.618 PAP SMEAR ABNORMALITY OF CERVIX/HUMAN PAPILLOMAVIRUS (HPV) POSITIVE: Status: RESOLVED | Noted: 2020-05-19 | Resolved: 2021-01-19

## 2021-01-19 LAB
HIV 1+2 AB+HIV1 P24 AG SERPL QL IA: NORMAL
TREPONEMA PALLIDUM IGG+IGM AB [PRESENCE] IN SERUM OR PLASMA BY IMMUNOASSAY: NORMAL

## 2021-01-19 PROCEDURE — 87491 CHLMYD TRACH DNA AMP PROBE: CPT

## 2021-01-19 PROCEDURE — 99213 OFFICE O/P EST LOW 20 MIN: CPT | Mod: 95,CR | Performed by: FAMILY MEDICINE

## 2021-01-19 PROCEDURE — 87591 N.GONORRHOEAE DNA AMP PROB: CPT

## 2021-01-19 PROCEDURE — 36415 COLL VENOUS BLD VENIPUNCTURE: CPT

## 2021-01-19 PROCEDURE — 87389 HIV-1 AG W/HIV-1&-2 AB AG IA: CPT

## 2021-01-19 PROCEDURE — 86780 TREPONEMA PALLIDUM: CPT

## 2021-01-19 RX ORDER — METRONIDAZOLE 500 MG/1
500 TABLET ORAL 2 TIMES DAILY
Qty: 14 TAB | Refills: 0 | Status: SHIPPED | OUTPATIENT
Start: 2021-01-19 | End: 2021-01-26

## 2021-01-19 ASSESSMENT — FIBROSIS 4 INDEX: FIB4 SCORE: 0.43

## 2021-01-19 NOTE — ASSESSMENT & PLAN NOTE
This is a new problem.  Recent vaginal swab positive for trichomonas and bacterial vaginosis.  The patient has noticed malodor over the past couple months, no vaginal discharge or pain. The patient is currently not sexually active.

## 2021-01-19 NOTE — PROGRESS NOTES
Telemedicine Visit: Established Patient     This evaluation was conducted via Zoom using secure and encrypted videoconferencing technology. The patient was in a private location in the state of [place].    The patient's identity was confirmed and verbal consent was obtained for this virtual visit.    Subjective:   CC: f/u labs  Sarita Mendoza is a 35 y.o. female presenting for evaluation and management of:    Pap smear abnormality of cervix/human papillomavirus (HPV) positive  History: The patient reports a history of positive high risk HPV on previous pap smear in 2017 or 2018. She had a colposcopy but did not follow up s/p colposcopy. She is unsure of the physician who completed her colposcopy. Patient scheduled for pap 1/15/2021.    Update: Pap completed 1/15/21 neg intraepithelial lesion, neg HPV.    Trichomonas vaginitis  Bacterial vaginosis  This is a new problem.  Recent vaginal swab positive for trichomonas and bacterial vaginosis.  The patient has noticed malodor over the past couple months, no vaginal discharge or pain. The patient is currently not sexually active.      ROS   Denies any recent fevers or chills. No nausea or vomiting. No chest pains or shortness of breath.     Allergies   Allergen Reactions   • Penicillins Rash   • Penicillins Rash       Current medicines (including changes today)  Current Outpatient Medications   Medication Sig Dispense Refill   • metroNIDAZOLE (FLAGYL) 500 MG Tab Take 1 Tab by mouth 2 Times a Day for 7 days. 14 Tab 0   • VITAMIN D PO Take  by mouth.     • MELATONIN PO Take  by mouth.     • Ascorbic Acid (VITAMIN C PO) Take  by mouth.     • topiramate (TOPAMAX) 100 MG Tab Take  by mouth. Take 1 tablet by mouth every night at bedtime     • clindamycin (CLEOCIN T) 1 % Gel Apply thin layer to affected area twice daily (Patient not taking: Reported on 1/19/2021) 60 g 6     No current facility-administered medications for this visit.        Patient Active Problem List     "Diagnosis Date Noted   • Trichomonas vaginitis 01/19/2021   • Bacterial vaginosis 01/19/2021   • Hidradenitis axillaris 12/11/2020   • History of methamphetamine abuse ([facility]) 05/19/2020   • Bipolar disorder, in full remission, most recent episode mixed ([facility]) 05/19/2020   • Pap smear abnormality of cervix/human papillomavirus (HPV) positive 05/19/2020       Family History   Problem Relation Age of Onset   • Migraines Mother    • No Known Problems Father    • Diabetes Maternal Grandmother        She  has a past medical history of Allergy, Anxiety, Migraine, and Substance abuse ([facility]).  She  has a past surgical history that includes tonsillectomy and adenoidectomy; primary c section; and gyn surgery.       Objective:   Ht 1.702 m (5' 7\") Comment: pt. reported  Wt 83.9 kg (185 lb) Comment: pt. reported  LMP  (LMP Unknown)   BMI 28.98 kg/m²     Physical Exam:  Constitutional: Alert, no distress, well-groomed.  Skin: No rashes in visible areas.  Eye: Round. Conjunctiva clear, lids normal. No icterus.   ENMT: Lips pink without lesions, good dentition, moist mucous membranes. Phonation normal.  Respiratory: Unlabored respiratory effort, no cough or audible wheeze  Psych: Alert and oriented x3, normal affect and mood.       Assessment and Plan:   The following treatment plan was discussed:     1. Trichomonas vaginitis  This is a new problem. Recent vaginal swab positive for illness.  Result discussed with patient.  We will proceed with antibiotic therapy per below.  - metroNIDAZOLE (FLAGYL) 500 MG Tab; Take 1 Tab by mouth 2 Times a Day for 7 days.  Dispense: 14 Tab; Refill: 0  - Chlamydia/GC PCR Urine Or Swab; Future  - HIV AG/AB COMBO ASSAY SCREENING; Future  - T.PALLIDUM AB EIA; Future    2. Bacterial vaginosis  - metroNIDAZOLE (FLAGYL) 500 MG Tab; Take 1 Tab by mouth 2 Times a Day for 7 days.  Dispense: 14 Tab; Refill: 0      Follow-up: Return in about 1 month (around 2/19/2021) for repeat trichomonas testing.          "

## 2021-01-19 NOTE — ASSESSMENT & PLAN NOTE
History: The patient reports a history of positive high risk HPV on previous pap smear in 2017 or 2018. She had a colposcopy but did not follow up s/p colposcopy. She is unsure of the physician who completed her colposcopy. Patient scheduled for pap 1/15/2021.    Update: Pap completed 1/15/21 neg intraepithelial lesion, neg HPV.

## 2021-01-21 LAB
C TRACH DNA SPEC QL NAA+PROBE: NEGATIVE
N GONORRHOEA DNA SPEC QL NAA+PROBE: NEGATIVE
SPECIMEN SOURCE: NORMAL

## 2021-02-23 ENCOUNTER — HOSPITAL ENCOUNTER (OUTPATIENT)
Facility: MEDICAL CENTER | Age: 36
End: 2021-02-23
Attending: FAMILY MEDICINE
Payer: COMMERCIAL

## 2021-02-23 ENCOUNTER — OFFICE VISIT (OUTPATIENT)
Dept: MEDICAL GROUP | Facility: MEDICAL CENTER | Age: 36
End: 2021-02-23
Payer: COMMERCIAL

## 2021-02-23 VITALS
HEART RATE: 81 BPM | TEMPERATURE: 98.9 F | BODY MASS INDEX: 30.35 KG/M2 | RESPIRATION RATE: 16 BRPM | OXYGEN SATURATION: 98 % | SYSTOLIC BLOOD PRESSURE: 108 MMHG | DIASTOLIC BLOOD PRESSURE: 70 MMHG | WEIGHT: 193.34 LBS | HEIGHT: 67 IN

## 2021-02-23 DIAGNOSIS — B37.9 YEAST INFECTION: ICD-10-CM

## 2021-02-23 DIAGNOSIS — B96.89 BACTERIAL VAGINOSIS: ICD-10-CM

## 2021-02-23 DIAGNOSIS — A59.01 TRICHOMONAS VAGINITIS: ICD-10-CM

## 2021-02-23 DIAGNOSIS — N76.0 BACTERIAL VAGINOSIS: ICD-10-CM

## 2021-02-23 PROCEDURE — 87480 CANDIDA DNA DIR PROBE: CPT

## 2021-02-23 PROCEDURE — 99000 SPECIMEN HANDLING OFFICE-LAB: CPT | Performed by: FAMILY MEDICINE

## 2021-02-23 PROCEDURE — 99214 OFFICE O/P EST MOD 30 MIN: CPT | Performed by: FAMILY MEDICINE

## 2021-02-23 PROCEDURE — 87591 N.GONORRHOEAE DNA AMP PROB: CPT

## 2021-02-23 PROCEDURE — 87491 CHLMYD TRACH DNA AMP PROBE: CPT

## 2021-02-23 PROCEDURE — 87510 GARDNER VAG DNA DIR PROBE: CPT

## 2021-02-23 PROCEDURE — 87660 TRICHOMONAS VAGIN DIR PROBE: CPT

## 2021-02-23 RX ORDER — FLUCONAZOLE 150 MG/1
150 TABLET ORAL ONCE
Qty: 3 TABLET | Refills: 1 | Status: SHIPPED | OUTPATIENT
Start: 2021-02-23 | End: 2021-02-23

## 2021-02-23 ASSESSMENT — FIBROSIS 4 INDEX: FIB4 SCORE: 0.43

## 2021-02-23 NOTE — ASSESSMENT & PLAN NOTE
Vaginal swab on 1/15/2021 revealed trichomonas and bacterial vaginosis.  She reported vaginal malodor at that time.  The patient was treated with metronidazole.  She reports her symptoms have resolved status post treatment.

## 2021-02-23 NOTE — ASSESSMENT & PLAN NOTE
The patient reports recurrent yeast infections, usually occur during her menstrual cycle. She reports vaginal itching and increased whitish discharge.

## 2021-02-23 NOTE — PROGRESS NOTES
"Subjective:     CC: f/u trichomonas vaginitis    HPI:   Sarita presents today with:    Trichomonas vaginitis  Vaginal swab on 1/15/2021 revealed trichomonas and bacterial vaginosis.  She reported vaginal malodor at that time.  The patient was treated with metronidazole.  She reports her symptoms have resolved status post treatment.    Yeast infection  The patient reports recurrent yeast infections, usually occur during her menstrual cycle. She reports vaginal itching and increased whitish discharge.      Past Medical History:   Diagnosis Date   • Allergy    • Anxiety    • Migraine    • Substance abuse ([facility])        Social History     Tobacco Use   • Smoking status: Former Smoker     Packs/day: 1.00     Years: 17.00     Pack years: 17.00     Types: Cigarettes   • Smokeless tobacco: Never Used   Substance Use Topics   • Alcohol use: No     Comment: quit   • Drug use: Not Currently     Comment: clean for 3 1/2 years       Current Outpatient Medications Ordered in Epic   Medication Sig Dispense Refill   • fluconazole (DIFLUCAN) 150 MG tablet Take 1 tablet by mouth one time for 1 dose. 150mg PO X 1; repeat dose in 72 hours if symptoms persist 3 tablet 1   • VITAMIN D PO Take  by mouth.     • MELATONIN PO Take  by mouth.     • Ascorbic Acid (VITAMIN C PO) Take  by mouth.     • topiramate (TOPAMAX) 100 MG Tab Take  by mouth. Take 1 tablet by mouth every night at bedtime       No current Epic-ordered facility-administered medications on file.       Allergies:  Penicillins and Penicillins    ROS:  Gen: no fevers/chills, no changes in weight  Eyes: no changes in vision  ENT: no sore throat, no hearing loss, no bloody nose  Pulm: no SOB  CV: no chest pain    Objective:       Exam:  /70 (BP Location: Left arm, Patient Position: Sitting, BP Cuff Size: Adult long)   Pulse 81   Temp 37.2 °C (98.9 °F) (Temporal)   Resp 16   Ht 1.702 m (5' 7\")   Wt 87.7 kg (193 lb 5.5 oz)   LMP 02/16/2021 (Within Days)   SpO2 98%   BMI " 30.28 kg/m²  Body mass index is 30.28 kg/m².    Constitutional: Alert, no distress, well-groomed  Skin: Warm, dry, good turgor, no rashes in visible areas  Eyes: Equal, round and reactive, conjunctiva clear, no ptosis  ENMT: Lips without lesions, good dentition, moist mucous membranes  Neck: Trachea midline, no obvious thyromegaly  Respiratory: Unlabored respiratory effort  Neuro: Grossly non-focal  Psych: Alert and oriented, normal affect and mood  : Vaginal canal and posterior fornix erythematous with small amount of whitish thick discharge. IUD strings in place. Cervix visualized with no abnormal lesions.      Assessment & Plan:     35 y.o. female with the following -     1. Trichomonas vaginitis  S/p treatment with metronidazole.  Patient symptoms have resolved.  Testing for cure today.  - Chlamydia/GC PCR Urine Or Swab; Future  - VAGINAL PATHOGENS DNA PANEL; Future    2. Bacterial vaginosis  Status post treatment with metronidazole  - VAGINAL PATHOGENS DNA PANEL; Future    3. Yeast infection  - VAGINAL PATHOGENS DNA PANEL; Future  - fluconazole (DIFLUCAN) 150 MG tablet; Take 1 tablet by mouth one time for 1 dose. 150mg PO X 1; repeat dose in 72 hours if symptoms persist  Dispense: 3 tablet; Refill: 1      Return in about 3 months (around 5/23/2021).    Please note this dictation was created using voice recognition software. I have made every reasonable attempt to correct obvious errors, but I expect there may be errors of grammar, and possibly content, that I did not discover before finalizing the note.

## 2021-02-24 LAB
C TRACH DNA SPEC QL NAA+PROBE: NEGATIVE
CANDIDA DNA VAG QL PROBE+SIG AMP: NEGATIVE
G VAGINALIS DNA VAG QL PROBE+SIG AMP: POSITIVE
N GONORRHOEA DNA SPEC QL NAA+PROBE: NEGATIVE
SPECIMEN SOURCE: NORMAL
T VAGINALIS DNA VAG QL PROBE+SIG AMP: NEGATIVE

## 2021-04-20 ENCOUNTER — PATIENT MESSAGE (OUTPATIENT)
Dept: MEDICAL GROUP | Facility: MEDICAL CENTER | Age: 36
End: 2021-04-20

## 2021-04-20 DIAGNOSIS — Z11.1 SCREENING-PULMONARY TB: ICD-10-CM

## 2021-04-20 NOTE — TELEPHONE ENCOUNTER
From: Sarita Mendoza  To: Physician Mary Ackerman  Sent: 4/20/2021 2:48 PM PDT  Subject: Non-Urgent Medical Question    I think I would prefer to just get a blood test so I can just get it done and over with.     Thank you for getting back to me. Do I need to call and make the appointment?      ----- Message -----   From:Physician Mary Ackerman   Sent:4/20/2021 1:53 PM PDT   To:Sarita Mendoza   Subject:RE: Non-Urgent Medical Question    Yes we can either do the skin testing (in office) or I can send you to the lab to get a blood test - let me know which you prefer.    Thank you,    Mary Krishnan MD      ----- Message -----   From:Sarita Mendoza   Sent:4/20/2021 1:12 PM PDT   To:Physician Mary Ackerman   Subject:Non-Urgent Medical Question    Hi Dr. Ackerman,     I was wondering if there is any way I could get a TB test done through you? I have to get it done for my work. Just let me know when you get a chance. Thank you.    Sarita Mendoza

## 2021-04-21 NOTE — TELEPHONE ENCOUNTER
From: Sarita Mendoza  To: Physician Mary Ackerman  Sent: 4/20/2021 4:53 PM PDT  Subject: Non-Urgent Medical Question    Ok well should I make an appointment with you after I go get the lab work done or do I need to see you before?      ----- Message -----   From:Physician Mary Ackerman   Sent:4/20/2021 4:52 PM PDT   To:Sarita Mendoza   Subject:RE: Non-Urgent Medical Question    Okay sounds good I will place the order. Yes you can call and make an appointment: [phone]. You can walk into the lab without an appointment but you may have to wait quit some time.    - Mary Krishnan MD      ----- Message -----   From:Sarita Mendoza   Sent:4/20/2021 2:48 PM PDT   To:Physician Mary Ackerman   Subject:Non-Urgent Medical Question    I think I would prefer to just get a blood test so I can just get it done and over with.     Thank you for getting back to me. Do I need to call and make the appointment?      ----- Message -----   From:Physician Mary Ackerman   Sent:4/20/2021 1:53 PM PDT   To:Sarita Mendoza   Subject:RE: Non-Urgent Medical Question    Yes we can either do the skin testing (in office) or I can send you to the lab to get a blood test - let me know which you prefer.    Thank you,    Mary Krishnan MD      ----- Message -----   From:Sarita Mendoza   Sent:4/20/2021 1:12 PM PDT   To:Physician Mary Ackerman   Subject:Non-Urgent Medical Question    Hi Dr. Ackerman,     I was wondering if there is any way I could get a TB test done through you? I have to get it done for my work. Just let me know when you get a chance. Thank you.    Sarita Mendoza

## 2021-08-26 ENCOUNTER — TELEMEDICINE (OUTPATIENT)
Dept: MEDICAL GROUP | Facility: MEDICAL CENTER | Age: 36
End: 2021-08-26
Payer: COMMERCIAL

## 2021-08-26 VITALS — HEIGHT: 67 IN | WEIGHT: 190 LBS | TEMPERATURE: 98.8 F | BODY MASS INDEX: 29.82 KG/M2

## 2021-08-26 DIAGNOSIS — R05.9 COUGH: ICD-10-CM

## 2021-08-26 PROCEDURE — 99213 OFFICE O/P EST LOW 20 MIN: CPT | Mod: 95,CR | Performed by: FAMILY MEDICINE

## 2021-08-26 RX ORDER — ALBUTEROL SULFATE 90 UG/1
2 AEROSOL, METERED RESPIRATORY (INHALATION) EVERY 4 HOURS PRN
Qty: 1 EACH | Refills: 0 | Status: SHIPPED | OUTPATIENT
Start: 2021-08-26 | End: 2022-04-19

## 2021-08-26 ASSESSMENT — FIBROSIS 4 INDEX: FIB4 SCORE: 0.44

## 2021-08-26 NOTE — PROGRESS NOTES
"Virtual Visit: Established Patient   This visit was conducted via Zoom using secure and encrypted videoconferencing technology.   The patient was in a private location in the state of [place].    The patient's identity was confirmed and verbal consent was obtained for this virtual visit.    Subjective:   CC:   Chief Complaint   Patient presents with   • Bronchitis     dry sore throat and chest., similar to past bronchitis        Sarita Mendoza is a 36 y.o. female presenting for evaluation and management of:    Cough  New problem. Cough started 4 days ago  Runny nose x 2 days  Sore throat started 5 or 6 days ago  Feels a little better today.   Was feeling SOB over the weekend, but that has improved.  No h/o asthma.   Has had bronchitis in the past, feels similar.  She does vape and has a h/o smoking.   Had covid vaccine.     ROS   See HPI    Current medicines (including changes today)  Current Outpatient Medications   Medication Sig Dispense Refill   • albuterol 108 (90 Base) MCG/ACT Aero Soln inhalation aerosol Inhale 2 Puffs every four hours as needed for Shortness of Breath. 1 Each 0   • VITAMIN D PO Take  by mouth.     • MELATONIN PO Take  by mouth.     • Ascorbic Acid (VITAMIN C PO) Take  by mouth.     • topiramate (TOPAMAX) 100 MG Tab Take  by mouth. Take 1 tablet by mouth every night at bedtime       No current facility-administered medications for this visit.       Patient Active Problem List    Diagnosis Date Noted   • Cough 08/26/2021   • Yeast infection 02/23/2021   • Trichomonas vaginitis 01/19/2021   • Bacterial vaginosis 01/19/2021   • Hidradenitis axillaris 12/11/2020   • History of methamphetamine abuse ([facility]) 05/19/2020   • Bipolar disorder, in full remission, most recent episode mixed ([facility]) 05/19/2020   • Pap smear abnormality of cervix/human papillomavirus (HPV) positive 05/19/2020        Objective:   Temp 37.1 °C (98.8 °F) (Temporal)   Ht 1.702 m (5' 7\")   Wt 86.2 kg (190 lb)   BMI 29.76 kg/m² "     Physical Exam:  Constitutional: Alert, no distress, well-groomed.  Skin: No rashes in visible areas.  Eye: Round. Conjunctiva clear, lids normal. No icterus.   ENMT: Lips pink without lesions, good dentition, moist mucous membranes. Phonation normal.  Neck: No masses, no thyromegaly. Moves freely without pain.  Respiratory: Unlabored respiratory effort, no cough or audible wheeze  Psych: Alert and oriented x3, normal affect and mood.     Assessment and Plan:   The following treatment plan was discussed:     1. Cough  New problem. Patient reports cough x 4 days. Having a hard time with some mild SOB likely from smoke and cough. Rx given for albuterol. If worsening, advised to please follow up.   - albuterol 108 (90 Base) MCG/ACT Aero Soln inhalation aerosol; Inhale 2 Puffs every four hours as needed for Shortness of Breath.  Dispense: 1 Each; Refill: 0      Follow-up: Return if symptoms worsen or fail to improve.

## 2021-08-26 NOTE — ASSESSMENT & PLAN NOTE
Cough started 4 days ago  Runny nose x 2 days  Sore throat started 5 days ago  Feels a little better today.   Was feeling SOB over the weekend, but that has improved.  No h/o asthma.   Has had bronchitis in the past.   She does vape and has a h/o smoking.   Had covid vaccine.

## 2022-04-19 ENCOUNTER — TELEMEDICINE (OUTPATIENT)
Dept: MEDICAL GROUP | Facility: MEDICAL CENTER | Age: 37
End: 2022-04-19
Payer: COMMERCIAL

## 2022-04-19 VITALS — WEIGHT: 180 LBS | BODY MASS INDEX: 28.25 KG/M2 | HEIGHT: 67 IN

## 2022-04-19 DIAGNOSIS — Z11.3 SCREEN FOR STD (SEXUALLY TRANSMITTED DISEASE): ICD-10-CM

## 2022-04-19 DIAGNOSIS — Z11.1 SCREENING-PULMONARY TB: ICD-10-CM

## 2022-04-19 DIAGNOSIS — Z13.6 SCREENING FOR CARDIOVASCULAR CONDITION: ICD-10-CM

## 2022-04-19 DIAGNOSIS — R53.83 FATIGUE, UNSPECIFIED TYPE: ICD-10-CM

## 2022-04-19 DIAGNOSIS — Z00.00 WELLNESS EXAMINATION: ICD-10-CM

## 2022-04-19 PROCEDURE — 99213 OFFICE O/P EST LOW 20 MIN: CPT | Mod: 95 | Performed by: PHYSICIAN ASSISTANT

## 2022-04-19 ASSESSMENT — FIBROSIS 4 INDEX: FIB4 SCORE: 0.44

## 2022-04-20 NOTE — PROGRESS NOTES
"Virtual Visit: Established Patient   This visit was conducted via Zoom using secure and encrypted videoconferencing technology.   The patient was in their home in the state of [place].    The patient's identity was confirmed and verbal consent was obtained for this virtual visit.    Subjective:   CC:   Chief Complaint   Patient presents with   • Requesting Labs     Add on STD testing     Sarita Mendoza is a 36 y.o. female presenting for evaluation and management of:    Patient scheduled same day virtual visit to request labs. PCP out of office on maternity leave.    Patient due for TB screen and regular screening labs. Works at a [employer] facility. Also has concern that current partner is using IV drugs and would like to be screened for hepatitis and HIV. No h/o either. asymptomatic    ROS no fever/chills. No headache/dizziness. No neck or back pain. No chest pain, SOB or difficulty breathing. No n/v/d/c or abdominal pain. No rash or skin lesion      Current medicines (including changes today)  Current Outpatient Medications   Medication Sig Dispense Refill   • topiramate (TOPAMAX) 200 MG tablet Take 200 mg by mouth at bedtime.       No current facility-administered medications for this visit.       Patient Active Problem List    Diagnosis Date Noted   • Cough 08/26/2021   • Yeast infection 02/23/2021   • Trichomonas vaginitis 01/19/2021   • Bacterial vaginosis 01/19/2021   • Hidradenitis axillaris 12/11/2020   • History of methamphetamine abuse ([facility]) 05/19/2020   • Bipolar disorder, in full remission, most recent episode mixed ([facility]) 05/19/2020   • Pap smear abnormality of cervix/human papillomavirus (HPV) positive 05/19/2020        Objective:   Ht 1.702 m (5' 7\") Comment: pt. reported  Wt 81.6 kg (180 lb) Comment: pt. reported  BMI 28.19 kg/m²     Physical Exam:  Constitutional: Alert, no distress, well-groomed.  Skin: No rashes in visible areas.  Eye: Round. Conjunctiva clear, lids normal. No icterus.   ENMT: " Lips pink without lesions, good dentition, moist mucous membranes. Phonation normal.  Neck: No masses, no thyromegaly. Moves freely without pain.  Respiratory: Unlabored respiratory effort, no cough or audible wheeze  Psych: Alert and oriented x3, normal affect and mood.     Assessment and Plan:   The following treatment plan was discussed:     1. Screen for STD (sexually transmitted disease)  - T.PALLIDUM AB EIA; Future  - HEPATITIS PANEL ACUTE(4 COMPONENTS); Future  - HIV AG/AB COMBO ASSAY SCREENING; Future    2. Wellness examination  - HEMOGLOBIN A1C; Future  - Comp Metabolic Panel; Future    3. Screening for cardiovascular condition  - Lipid Profile; Future    4. Screening-pulmonary TB  - Quantiferon Gold TB (PPD); Future    5. Fatigue, unspecified type  - VITAMIN D,25 HYDROXY; Future  - CBC WITH DIFFERENTIAL; Future  - IRON/TOTAL IRON BIND; Future    Other orders  - topiramate (TOPAMAX) 200 MG tablet; Take 200 mg by mouth at bedtime.      Follow-up: with PCP

## 2022-06-27 ENCOUNTER — HOSPITAL ENCOUNTER (OUTPATIENT)
Dept: LAB | Facility: MEDICAL CENTER | Age: 37
End: 2022-06-27
Attending: PHYSICIAN ASSISTANT
Payer: COMMERCIAL

## 2022-06-27 DIAGNOSIS — Z13.6 SCREENING FOR CARDIOVASCULAR CONDITION: ICD-10-CM

## 2022-06-27 DIAGNOSIS — R53.83 FATIGUE, UNSPECIFIED TYPE: ICD-10-CM

## 2022-06-27 DIAGNOSIS — Z00.00 WELLNESS EXAMINATION: ICD-10-CM

## 2022-06-27 DIAGNOSIS — Z11.1 SCREENING-PULMONARY TB: ICD-10-CM

## 2022-06-27 DIAGNOSIS — Z11.3 SCREEN FOR STD (SEXUALLY TRANSMITTED DISEASE): ICD-10-CM

## 2022-06-27 LAB
25(OH)D3 SERPL-MCNC: 40 NG/ML (ref 30–100)
ALBUMIN SERPL BCP-MCNC: 4.4 G/DL (ref 3.2–4.9)
ALBUMIN/GLOB SERPL: 1.5 G/DL
ALP SERPL-CCNC: 87 U/L (ref 30–99)
ALT SERPL-CCNC: 10 U/L (ref 2–50)
ANION GAP SERPL CALC-SCNC: 10 MMOL/L (ref 7–16)
AST SERPL-CCNC: 15 U/L (ref 12–45)
BASOPHILS # BLD AUTO: 0.9 % (ref 0–1.8)
BASOPHILS # BLD: 0.08 K/UL (ref 0–0.12)
BILIRUB SERPL-MCNC: 0.2 MG/DL (ref 0.1–1.5)
BUN SERPL-MCNC: 15 MG/DL (ref 8–22)
CALCIUM SERPL-MCNC: 8.8 MG/DL (ref 8.5–10.5)
CHLORIDE SERPL-SCNC: 109 MMOL/L (ref 96–112)
CHOLEST SERPL-MCNC: 124 MG/DL (ref 100–199)
CO2 SERPL-SCNC: 22 MMOL/L (ref 20–33)
CREAT SERPL-MCNC: 0.78 MG/DL (ref 0.5–1.4)
EOSINOPHIL # BLD AUTO: 0.28 K/UL (ref 0–0.51)
EOSINOPHIL NFR BLD: 3 % (ref 0–6.9)
ERYTHROCYTE [DISTWIDTH] IN BLOOD BY AUTOMATED COUNT: 44.8 FL (ref 35.9–50)
EST. AVERAGE GLUCOSE BLD GHB EST-MCNC: 88 MG/DL
FASTING STATUS PATIENT QL REPORTED: NORMAL
GFR SERPLBLD CREATININE-BSD FMLA CKD-EPI: 100 ML/MIN/1.73 M 2
GLOBULIN SER CALC-MCNC: 2.9 G/DL (ref 1.9–3.5)
GLUCOSE SERPL-MCNC: 94 MG/DL (ref 65–99)
HAV IGM SERPL QL IA: NORMAL
HBA1C MFR BLD: 4.7 % (ref 4–5.6)
HBV CORE IGM SER QL: NORMAL
HBV SURFACE AG SER QL: NORMAL
HCT VFR BLD AUTO: 43.1 % (ref 37–47)
HCV AB SER QL: NORMAL
HDLC SERPL-MCNC: 40 MG/DL
HGB BLD-MCNC: 14 G/DL (ref 12–16)
HIV 1+2 AB+HIV1 P24 AG SERPL QL IA: NORMAL
IMM GRANULOCYTES # BLD AUTO: 0.04 K/UL (ref 0–0.11)
IMM GRANULOCYTES NFR BLD AUTO: 0.4 % (ref 0–0.9)
IRON SATN MFR SERPL: 12 % (ref 15–55)
IRON SERPL-MCNC: 34 UG/DL (ref 40–170)
LDLC SERPL CALC-MCNC: 73 MG/DL
LYMPHOCYTES # BLD AUTO: 2.35 K/UL (ref 1–4.8)
LYMPHOCYTES NFR BLD: 25.4 % (ref 22–41)
MCH RBC QN AUTO: 31.1 PG (ref 27–33)
MCHC RBC AUTO-ENTMCNC: 32.5 G/DL (ref 33.6–35)
MCV RBC AUTO: 95.8 FL (ref 81.4–97.8)
MONOCYTES # BLD AUTO: 0.56 K/UL (ref 0–0.85)
MONOCYTES NFR BLD AUTO: 6.1 % (ref 0–13.4)
NEUTROPHILS # BLD AUTO: 5.94 K/UL (ref 2–7.15)
NEUTROPHILS NFR BLD: 64.2 % (ref 44–72)
NRBC # BLD AUTO: 0 K/UL
NRBC BLD-RTO: 0 /100 WBC
PLATELET # BLD AUTO: 359 K/UL (ref 164–446)
PMV BLD AUTO: 11.1 FL (ref 9–12.9)
POTASSIUM SERPL-SCNC: 4 MMOL/L (ref 3.6–5.5)
PROT SERPL-MCNC: 7.3 G/DL (ref 6–8.2)
RBC # BLD AUTO: 4.5 M/UL (ref 4.2–5.4)
SODIUM SERPL-SCNC: 141 MMOL/L (ref 135–145)
T PALLIDUM AB SER QL IA: NORMAL
TIBC SERPL-MCNC: 287 UG/DL (ref 250–450)
TRIGL SERPL-MCNC: 57 MG/DL (ref 0–149)
UIBC SERPL-MCNC: 253 UG/DL (ref 110–370)
WBC # BLD AUTO: 9.3 K/UL (ref 4.8–10.8)

## 2022-06-27 PROCEDURE — 83036 HEMOGLOBIN GLYCOSYLATED A1C: CPT

## 2022-06-27 PROCEDURE — 80053 COMPREHEN METABOLIC PANEL: CPT

## 2022-06-27 PROCEDURE — 80061 LIPID PANEL: CPT

## 2022-06-27 PROCEDURE — 83550 IRON BINDING TEST: CPT

## 2022-06-27 PROCEDURE — 82306 VITAMIN D 25 HYDROXY: CPT

## 2022-06-27 PROCEDURE — 86480 TB TEST CELL IMMUN MEASURE: CPT

## 2022-06-27 PROCEDURE — 87389 HIV-1 AG W/HIV-1&-2 AB AG IA: CPT

## 2022-06-27 PROCEDURE — 83540 ASSAY OF IRON: CPT

## 2022-06-27 PROCEDURE — 85025 COMPLETE CBC W/AUTO DIFF WBC: CPT

## 2022-06-27 PROCEDURE — 36415 COLL VENOUS BLD VENIPUNCTURE: CPT

## 2022-06-27 PROCEDURE — 86780 TREPONEMA PALLIDUM: CPT

## 2022-06-27 PROCEDURE — 80074 ACUTE HEPATITIS PANEL: CPT

## 2022-06-28 ENCOUNTER — PATIENT MESSAGE (OUTPATIENT)
Dept: MEDICAL GROUP | Facility: MEDICAL CENTER | Age: 37
End: 2022-06-28
Payer: COMMERCIAL

## 2022-06-28 LAB
GAMMA INTERFERON BACKGROUND BLD IA-ACNC: 0.04 IU/ML
M TB IFN-G BLD-IMP: NEGATIVE
M TB IFN-G CD4+ BCKGRND COR BLD-ACNC: 0 IU/ML
MITOGEN IGNF BCKGRD COR BLD-ACNC: >10 IU/ML
QFT TB2 - NIL TBQ2: 0 IU/ML

## 2022-12-26 ENCOUNTER — HOSPITAL ENCOUNTER (OUTPATIENT)
Facility: MEDICAL CENTER | Age: 37
End: 2022-12-26
Attending: NURSE PRACTITIONER
Payer: COMMERCIAL

## 2022-12-26 LAB
25(OH)D3 SERPL-MCNC: 29 NG/ML (ref 30–100)
ALBUMIN SERPL BCP-MCNC: 4.4 G/DL (ref 3.2–4.9)
ALBUMIN/GLOB SERPL: 2.2 G/DL
ALP SERPL-CCNC: 64 U/L (ref 30–99)
ALT SERPL-CCNC: 11 U/L (ref 2–50)
ANION GAP SERPL CALC-SCNC: 7 MMOL/L (ref 7–16)
AST SERPL-CCNC: 16 U/L (ref 12–45)
BASOPHILS # BLD AUTO: 0.7 % (ref 0–1.8)
BASOPHILS # BLD: 0.04 K/UL (ref 0–0.12)
BILIRUB SERPL-MCNC: 0.6 MG/DL (ref 0.1–1.5)
BUN SERPL-MCNC: 11 MG/DL (ref 8–22)
CALCIUM ALBUM COR SERPL-MCNC: 8.6 MG/DL (ref 8.5–10.5)
CALCIUM SERPL-MCNC: 8.9 MG/DL (ref 8.5–10.5)
CHLORIDE SERPL-SCNC: 105 MMOL/L (ref 96–112)
CHOLEST SERPL-MCNC: 131 MG/DL (ref 100–199)
CO2 SERPL-SCNC: 27 MMOL/L (ref 20–33)
CREAT SERPL-MCNC: 0.51 MG/DL (ref 0.5–1.4)
EOSINOPHIL # BLD AUTO: 0.15 K/UL (ref 0–0.51)
EOSINOPHIL NFR BLD: 2.5 % (ref 0–6.9)
ERYTHROCYTE [DISTWIDTH] IN BLOOD BY AUTOMATED COUNT: 46.9 FL (ref 35.9–50)
FOLATE SERPL-MCNC: 7.4 NG/ML
GFR SERPLBLD CREATININE-BSD FMLA CKD-EPI: 122 ML/MIN/1.73 M 2
GLOBULIN SER CALC-MCNC: 2 G/DL (ref 1.9–3.5)
GLUCOSE SERPL-MCNC: 82 MG/DL (ref 65–99)
HCT VFR BLD AUTO: 41.5 % (ref 37–47)
HDLC SERPL-MCNC: 53 MG/DL
HGB BLD-MCNC: 13.1 G/DL (ref 12–16)
IMM GRANULOCYTES # BLD AUTO: 0.02 K/UL (ref 0–0.11)
IMM GRANULOCYTES NFR BLD AUTO: 0.3 % (ref 0–0.9)
IRON SATN MFR SERPL: 46 % (ref 15–55)
IRON SERPL-MCNC: 143 UG/DL (ref 40–170)
LDLC SERPL CALC-MCNC: 70 MG/DL
LYMPHOCYTES # BLD AUTO: 1.67 K/UL (ref 1–4.8)
LYMPHOCYTES NFR BLD: 27.9 % (ref 22–41)
MCH RBC QN AUTO: 31.1 PG (ref 27–33)
MCHC RBC AUTO-ENTMCNC: 31.6 G/DL (ref 33.6–35)
MCV RBC AUTO: 98.6 FL (ref 81.4–97.8)
MONOCYTES # BLD AUTO: 0.33 K/UL (ref 0–0.85)
MONOCYTES NFR BLD AUTO: 5.5 % (ref 0–13.4)
NEUTROPHILS # BLD AUTO: 3.78 K/UL (ref 2–7.15)
NEUTROPHILS NFR BLD: 63.1 % (ref 44–72)
NRBC # BLD AUTO: 0 K/UL
NRBC BLD-RTO: 0 /100 WBC
PLATELET # BLD AUTO: 315 K/UL (ref 164–446)
PMV BLD AUTO: 11.2 FL (ref 9–12.9)
POTASSIUM SERPL-SCNC: 4.2 MMOL/L (ref 3.6–5.5)
PROT SERPL-MCNC: 6.4 G/DL (ref 6–8.2)
RBC # BLD AUTO: 4.21 M/UL (ref 4.2–5.4)
SODIUM SERPL-SCNC: 139 MMOL/L (ref 135–145)
T4 SERPL-MCNC: 6.8 UG/DL (ref 4–12)
TIBC SERPL-MCNC: 310 UG/DL (ref 250–450)
TRIGL SERPL-MCNC: 38 MG/DL (ref 0–149)
TSH SERPL DL<=0.005 MIU/L-ACNC: 1.6 UIU/ML (ref 0.38–5.33)
UIBC SERPL-MCNC: 167 UG/DL (ref 110–370)
VIT B12 SERPL-MCNC: 333 PG/ML (ref 211–911)
WBC # BLD AUTO: 6 K/UL (ref 4.8–10.8)

## 2022-12-26 PROCEDURE — 83550 IRON BINDING TEST: CPT

## 2022-12-26 PROCEDURE — 83540 ASSAY OF IRON: CPT

## 2022-12-26 PROCEDURE — 82746 ASSAY OF FOLIC ACID SERUM: CPT

## 2022-12-26 PROCEDURE — 84443 ASSAY THYROID STIM HORMONE: CPT

## 2022-12-26 PROCEDURE — 84436 ASSAY OF TOTAL THYROXINE: CPT

## 2022-12-26 PROCEDURE — 80053 COMPREHEN METABOLIC PANEL: CPT

## 2022-12-26 PROCEDURE — 82607 VITAMIN B-12: CPT

## 2022-12-26 PROCEDURE — 80061 LIPID PANEL: CPT

## 2022-12-26 PROCEDURE — 82306 VITAMIN D 25 HYDROXY: CPT

## 2022-12-26 PROCEDURE — 83036 HEMOGLOBIN GLYCOSYLATED A1C: CPT

## 2022-12-26 PROCEDURE — 85025 COMPLETE CBC W/AUTO DIFF WBC: CPT

## 2022-12-27 LAB
EST. AVERAGE GLUCOSE BLD GHB EST-MCNC: 94 MG/DL
HBA1C MFR BLD: 4.9 % (ref 4–5.6)

## 2023-12-05 ENCOUNTER — APPOINTMENT (OUTPATIENT)
Dept: LAB | Facility: MEDICAL CENTER | Age: 38
End: 2023-12-05
Payer: COMMERCIAL

## 2023-12-12 ENCOUNTER — HOSPITAL ENCOUNTER (OUTPATIENT)
Dept: LAB | Facility: MEDICAL CENTER | Age: 38
End: 2023-12-12
Attending: NURSE PRACTITIONER
Payer: COMMERCIAL

## 2023-12-12 PROCEDURE — 36415 COLL VENOUS BLD VENIPUNCTURE: CPT

## 2023-12-12 PROCEDURE — 86480 TB TEST CELL IMMUN MEASURE: CPT

## 2023-12-13 LAB
GAMMA INTERFERON BACKGROUND BLD IA-ACNC: 0.03 IU/ML
M TB IFN-G BLD-IMP: NEGATIVE
M TB IFN-G CD4+ BCKGRND COR BLD-ACNC: 0 IU/ML
MITOGEN IGNF BCKGRD COR BLD-ACNC: >10 IU/ML
QFT TB2 - NIL TBQ2: 0 IU/ML

## 2024-11-21 ENCOUNTER — HOSPITAL ENCOUNTER (OUTPATIENT)
Facility: MEDICAL CENTER | Age: 39
End: 2024-11-21
Attending: NURSE PRACTITIONER
Payer: COMMERCIAL

## 2024-11-21 PROCEDURE — 83540 ASSAY OF IRON: CPT

## 2024-11-21 PROCEDURE — 80061 LIPID PANEL: CPT

## 2024-11-21 PROCEDURE — 83036 HEMOGLOBIN GLYCOSYLATED A1C: CPT

## 2024-11-21 PROCEDURE — 82306 VITAMIN D 25 HYDROXY: CPT

## 2024-11-21 PROCEDURE — 82728 ASSAY OF FERRITIN: CPT

## 2024-11-21 PROCEDURE — 84443 ASSAY THYROID STIM HORMONE: CPT

## 2024-11-21 PROCEDURE — 83550 IRON BINDING TEST: CPT

## 2024-11-21 PROCEDURE — 80053 COMPREHEN METABOLIC PANEL: CPT

## 2024-11-21 PROCEDURE — 85025 COMPLETE CBC W/AUTO DIFF WBC: CPT

## 2024-11-22 LAB
25(OH)D3 SERPL-MCNC: 29 NG/ML (ref 30–100)
ALBUMIN SERPL BCP-MCNC: 4.8 G/DL (ref 3.2–4.9)
ALBUMIN/GLOB SERPL: 1.8 G/DL
ALP SERPL-CCNC: 78 U/L (ref 30–99)
ALT SERPL-CCNC: 10 U/L (ref 2–50)
ANION GAP SERPL CALC-SCNC: 11 MMOL/L (ref 7–16)
AST SERPL-CCNC: 15 U/L (ref 12–45)
BASOPHILS # BLD AUTO: 1 % (ref 0–1.8)
BASOPHILS # BLD: 0.08 K/UL (ref 0–0.12)
BILIRUB SERPL-MCNC: 0.6 MG/DL (ref 0.1–1.5)
BUN SERPL-MCNC: 11 MG/DL (ref 8–22)
CALCIUM ALBUM COR SERPL-MCNC: 8.7 MG/DL (ref 8.5–10.5)
CALCIUM SERPL-MCNC: 9.3 MG/DL (ref 8.5–10.5)
CHLORIDE SERPL-SCNC: 104 MMOL/L (ref 96–112)
CHOLEST SERPL-MCNC: 131 MG/DL (ref 100–199)
CO2 SERPL-SCNC: 25 MMOL/L (ref 20–33)
CREAT SERPL-MCNC: 0.62 MG/DL (ref 0.5–1.4)
EOSINOPHIL # BLD AUTO: 0.11 K/UL (ref 0–0.51)
EOSINOPHIL NFR BLD: 1.4 % (ref 0–6.9)
ERYTHROCYTE [DISTWIDTH] IN BLOOD BY AUTOMATED COUNT: 41.5 FL (ref 35.9–50)
EST. AVERAGE GLUCOSE BLD GHB EST-MCNC: 82 MG/DL
FERRITIN SERPL-MCNC: 156 NG/ML (ref 10–291)
GFR SERPLBLD CREATININE-BSD FMLA CKD-EPI: 115 ML/MIN/1.73 M 2
GLOBULIN SER CALC-MCNC: 2.6 G/DL (ref 1.9–3.5)
GLUCOSE SERPL-MCNC: 89 MG/DL (ref 65–99)
HBA1C MFR BLD: 4.5 % (ref 4–5.6)
HCT VFR BLD AUTO: 45.8 % (ref 37–47)
HDLC SERPL-MCNC: 50 MG/DL
HGB BLD-MCNC: 15.2 G/DL (ref 12–16)
IMM GRANULOCYTES # BLD AUTO: 0.03 K/UL (ref 0–0.11)
IMM GRANULOCYTES NFR BLD AUTO: 0.4 % (ref 0–0.9)
IRON SATN MFR SERPL: 18 % (ref 15–55)
IRON SERPL-MCNC: 57 UG/DL (ref 40–170)
LDLC SERPL CALC-MCNC: 68 MG/DL
LYMPHOCYTES # BLD AUTO: 1.99 K/UL (ref 1–4.8)
LYMPHOCYTES NFR BLD: 25.9 % (ref 22–41)
MCH RBC QN AUTO: 30.2 PG (ref 27–33)
MCHC RBC AUTO-ENTMCNC: 33.2 G/DL (ref 32.2–35.5)
MCV RBC AUTO: 91.1 FL (ref 81.4–97.8)
MONOCYTES # BLD AUTO: 0.36 K/UL (ref 0–0.85)
MONOCYTES NFR BLD AUTO: 4.7 % (ref 0–13.4)
NEUTROPHILS # BLD AUTO: 5.11 K/UL (ref 1.82–7.42)
NEUTROPHILS NFR BLD: 66.6 % (ref 44–72)
NRBC # BLD AUTO: 0 K/UL
NRBC BLD-RTO: 0 /100 WBC (ref 0–0.2)
PLATELET # BLD AUTO: 400 K/UL (ref 164–446)
PMV BLD AUTO: 11 FL (ref 9–12.9)
POTASSIUM SERPL-SCNC: 3.9 MMOL/L (ref 3.6–5.5)
PROT SERPL-MCNC: 7.4 G/DL (ref 6–8.2)
RBC # BLD AUTO: 5.03 M/UL (ref 4.2–5.4)
SODIUM SERPL-SCNC: 140 MMOL/L (ref 135–145)
TIBC SERPL-MCNC: 313 UG/DL (ref 250–450)
TRIGL SERPL-MCNC: 67 MG/DL (ref 0–149)
TSH SERPL-ACNC: 1.21 UIU/ML (ref 0.35–5.5)
UIBC SERPL-MCNC: 256 UG/DL (ref 110–370)
WBC # BLD AUTO: 7.7 K/UL (ref 4.8–10.8)

## 2025-02-13 SDOH — HEALTH STABILITY: PHYSICAL HEALTH: ON AVERAGE, HOW MANY MINUTES DO YOU ENGAGE IN EXERCISE AT THIS LEVEL?: 60 MIN

## 2025-02-13 SDOH — ECONOMIC STABILITY: FOOD INSECURITY: WITHIN THE PAST 12 MONTHS, YOU WORRIED THAT YOUR FOOD WOULD RUN OUT BEFORE YOU GOT MONEY TO BUY MORE.: OFTEN TRUE

## 2025-02-13 SDOH — ECONOMIC STABILITY: TRANSPORTATION INSECURITY
IN THE PAST 12 MONTHS, HAS THE LACK OF TRANSPORTATION KEPT YOU FROM MEDICAL APPOINTMENTS OR FROM GETTING MEDICATIONS?: NO

## 2025-02-13 SDOH — ECONOMIC STABILITY: INCOME INSECURITY: HOW HARD IS IT FOR YOU TO PAY FOR THE VERY BASICS LIKE FOOD, HOUSING, MEDICAL CARE, AND HEATING?: SOMEWHAT HARD

## 2025-02-13 SDOH — ECONOMIC STABILITY: INCOME INSECURITY: IN THE LAST 12 MONTHS, WAS THERE A TIME WHEN YOU WERE NOT ABLE TO PAY THE MORTGAGE OR RENT ON TIME?: NO

## 2025-02-13 SDOH — HEALTH STABILITY: PHYSICAL HEALTH: ON AVERAGE, HOW MANY DAYS PER WEEK DO YOU ENGAGE IN MODERATE TO STRENUOUS EXERCISE (LIKE A BRISK WALK)?: 3 DAYS

## 2025-02-13 SDOH — ECONOMIC STABILITY: TRANSPORTATION INSECURITY
IN THE PAST 12 MONTHS, HAS LACK OF TRANSPORTATION KEPT YOU FROM MEETINGS, WORK, OR FROM GETTING THINGS NEEDED FOR DAILY LIVING?: NO

## 2025-02-13 SDOH — ECONOMIC STABILITY: FOOD INSECURITY: WITHIN THE PAST 12 MONTHS, THE FOOD YOU BOUGHT JUST DIDN'T LAST AND YOU DIDN'T HAVE MONEY TO GET MORE.: OFTEN TRUE

## 2025-02-13 SDOH — HEALTH STABILITY: MENTAL HEALTH
STRESS IS WHEN SOMEONE FEELS TENSE, NERVOUS, ANXIOUS, OR CAN'T SLEEP AT NIGHT BECAUSE THEIR MIND IS TROUBLED. HOW STRESSED ARE YOU?: VERY MUCH

## 2025-02-13 SDOH — ECONOMIC STABILITY: HOUSING INSECURITY
IN THE LAST 12 MONTHS, WAS THERE A TIME WHEN YOU DID NOT HAVE A STEADY PLACE TO SLEEP OR SLEPT IN A SHELTER (INCLUDING NOW)?: NO

## 2025-02-13 SDOH — ECONOMIC STABILITY: TRANSPORTATION INSECURITY
IN THE PAST 12 MONTHS, HAS LACK OF RELIABLE TRANSPORTATION KEPT YOU FROM MEDICAL APPOINTMENTS, MEETINGS, WORK OR FROM GETTING THINGS NEEDED FOR DAILY LIVING?: NO

## 2025-02-13 ASSESSMENT — SOCIAL DETERMINANTS OF HEALTH (SDOH)
HOW OFTEN DO YOU GET TOGETHER WITH FRIENDS OR RELATIVES?: THREE TIMES A WEEK
IN THE PAST 12 MONTHS, HAS THE ELECTRIC, GAS, OIL, OR WATER COMPANY THREATENED TO SHUT OFF SERVICE IN YOUR HOME?: NO
HOW OFTEN DO YOU HAVE SIX OR MORE DRINKS ON ONE OCCASION: NEVER
WITHIN THE PAST 12 MONTHS, YOU WORRIED THAT YOUR FOOD WOULD RUN OUT BEFORE YOU GOT THE MONEY TO BUY MORE: OFTEN TRUE
DO YOU BELONG TO ANY CLUBS OR ORGANIZATIONS SUCH AS CHURCH GROUPS UNIONS, FRATERNAL OR ATHLETIC GROUPS, OR SCHOOL GROUPS?: NO
HOW OFTEN DO YOU HAVE A DRINK CONTAINING ALCOHOL: 2-4 TIMES A MONTH
HOW OFTEN DO YOU ATTENT MEETINGS OF THE CLUB OR ORGANIZATION YOU BELONG TO?: NEVER
HOW MANY DRINKS CONTAINING ALCOHOL DO YOU HAVE ON A TYPICAL DAY WHEN YOU ARE DRINKING: 1 OR 2
HOW OFTEN DO YOU ATTEND CHURCH OR RELIGIOUS SERVICES?: NEVER
DO YOU BELONG TO ANY CLUBS OR ORGANIZATIONS SUCH AS CHURCH GROUPS UNIONS, FRATERNAL OR ATHLETIC GROUPS, OR SCHOOL GROUPS?: NO
HOW OFTEN DO YOU ATTENT MEETINGS OF THE CLUB OR ORGANIZATION YOU BELONG TO?: NEVER
IN A TYPICAL WEEK, HOW MANY TIMES DO YOU TALK ON THE PHONE WITH FAMILY, FRIENDS, OR NEIGHBORS?: MORE THAN THREE TIMES A WEEK
ARE YOU MARRIED, WIDOWED, DIVORCED, SEPARATED, NEVER MARRIED, OR LIVING WITH A PARTNER?: LIVING WITH PARTNER
HOW OFTEN DO YOU GET TOGETHER WITH FRIENDS OR RELATIVES?: THREE TIMES A WEEK
IN A TYPICAL WEEK, HOW MANY TIMES DO YOU TALK ON THE PHONE WITH FAMILY, FRIENDS, OR NEIGHBORS?: MORE THAN THREE TIMES A WEEK
ARE YOU MARRIED, WIDOWED, DIVORCED, SEPARATED, NEVER MARRIED, OR LIVING WITH A PARTNER?: LIVING WITH PARTNER
HOW HARD IS IT FOR YOU TO PAY FOR THE VERY BASICS LIKE FOOD, HOUSING, MEDICAL CARE, AND HEATING?: SOMEWHAT HARD
HOW OFTEN DO YOU ATTEND CHURCH OR RELIGIOUS SERVICES?: NEVER

## 2025-02-13 ASSESSMENT — LIFESTYLE VARIABLES
AUDIT-C TOTAL SCORE: 2
HOW MANY STANDARD DRINKS CONTAINING ALCOHOL DO YOU HAVE ON A TYPICAL DAY: 1 OR 2
HOW OFTEN DO YOU HAVE A DRINK CONTAINING ALCOHOL: 2-4 TIMES A MONTH
HOW OFTEN DO YOU HAVE SIX OR MORE DRINKS ON ONE OCCASION: NEVER
SKIP TO QUESTIONS 9-10: 1

## 2025-02-14 ENCOUNTER — OFFICE VISIT (OUTPATIENT)
Dept: MEDICAL GROUP | Facility: LAB | Age: 40
End: 2025-02-14
Payer: COMMERCIAL

## 2025-02-14 VITALS
DIASTOLIC BLOOD PRESSURE: 70 MMHG | HEIGHT: 67 IN | HEART RATE: 73 BPM | WEIGHT: 174.82 LBS | SYSTOLIC BLOOD PRESSURE: 110 MMHG | OXYGEN SATURATION: 100 % | TEMPERATURE: 97.4 F | BODY MASS INDEX: 27.44 KG/M2 | RESPIRATION RATE: 16 BRPM

## 2025-02-14 DIAGNOSIS — R09.82 POST-NASAL DRIP: ICD-10-CM

## 2025-02-14 DIAGNOSIS — Z76.89 ENCOUNTER TO ESTABLISH CARE: ICD-10-CM

## 2025-02-14 DIAGNOSIS — Z01.84 IMMUNITY STATUS TESTING: ICD-10-CM

## 2025-02-14 DIAGNOSIS — Z23 NEED FOR VACCINATION: ICD-10-CM

## 2025-02-14 DIAGNOSIS — F31.78 BIPOLAR DISORDER, IN FULL REMISSION, MOST RECENT EPISODE MIXED (HCC): ICD-10-CM

## 2025-02-14 PROBLEM — B37.9 YEAST INFECTION: Status: RESOLVED | Noted: 2021-02-23 | Resolved: 2025-02-14

## 2025-02-14 PROBLEM — R05.9 COUGH: Status: RESOLVED | Noted: 2021-08-26 | Resolved: 2025-02-14

## 2025-02-14 PROBLEM — A59.01 TRICHOMONAS VAGINITIS: Status: RESOLVED | Noted: 2021-01-19 | Resolved: 2025-02-14

## 2025-02-14 PROBLEM — B96.89 BACTERIAL VAGINOSIS: Status: RESOLVED | Noted: 2021-01-19 | Resolved: 2025-02-14

## 2025-02-14 PROBLEM — N76.0 BACTERIAL VAGINOSIS: Status: RESOLVED | Noted: 2021-01-19 | Resolved: 2025-02-14

## 2025-02-14 PROCEDURE — 99213 OFFICE O/P EST LOW 20 MIN: CPT | Mod: 25 | Performed by: PHYSICIAN ASSISTANT

## 2025-02-14 PROCEDURE — 90715 TDAP VACCINE 7 YRS/> IM: CPT | Performed by: PHYSICIAN ASSISTANT

## 2025-02-14 PROCEDURE — 90471 IMMUNIZATION ADMIN: CPT | Performed by: PHYSICIAN ASSISTANT

## 2025-02-14 RX ORDER — ATOMOXETINE 25 MG/1
25 CAPSULE ORAL DAILY
COMMUNITY

## 2025-02-14 RX ORDER — VENLAFAXINE 37.5 MG/1
37.5 TABLET ORAL 3 TIMES DAILY
COMMUNITY

## 2025-02-14 RX ORDER — ZOLPIDEM TARTRATE 5 MG/1
5 TABLET ORAL NIGHTLY PRN
COMMUNITY

## 2025-02-14 ASSESSMENT — FIBROSIS 4 INDEX: FIB4 SCORE: 0.47

## 2025-02-14 NOTE — PROGRESS NOTES
Subjective:     CC:  There were no encounter diagnoses.    HISTORY OF THE PRESENT ILLNESS: Patient is a 39 y.o. female. This pleasant patient is here today to establish care  Verbal consent was acquired by the patient to use [employer] ambient listening note generation during this visit Yes     History of Present Illness  The patient presents to establish care.    #Hx of Bipolar  She is seeking a new psychiatric referral regarding addressing her bipolar symptoms. She has been prescribed Ambien, which she dislikes and believes should not be combined with Effexor. She has a history of bipolar disorder, diagnosed during a period of sobriety, and reports experiencing symptoms consistent with this diagnosis. 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 She is currently on venlafaxine 37.5 mg. She is not taking topiramate or hydroxyzine 25 mg, although the latter is prescribed. She has been prescribed high-dose vitamin D but often forgets to take it.    #Sebaceous Cyst  She reports a small, fluctuating bump on the back of her neck that occasionally enlarges and then disappears.    #Throat Irritation  She experiences frequent throat clearing, which she finds distressing and anxiety-provoking, and reports an associated chest discomfort. She does not experience heartburn, sour stomach, allergy symptoms, congestion, or sinus drainage.    #Hx of abnormal pap  She has a history of abnormal Pap smear in 2020, which was HPV positive, and has recently had another Pap smear that was WNL. She is under the care of an OB-GYN.     She has no significant medical history such as hypertension, hyperglycemia, or hypercholesterolemia. She is up to date with dental and eye exams. She has a referral for a mammogram. She has never undergone skin cancer screening. She is uncertain about her hepatitis B vaccination status. She had chickenpox as a child.    Supplemental Information  She had a lymph node biopsy in her  childhood, which was benign. She reports feeling safe at home.    SOCIAL HISTORY  The patient is a former smoker and no longer smokes. She drinks alcohol occasionally, but not on a daily or weekly basis. 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 She is sexually active with a male partner who has had a vasectomy. 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    FAMILY HISTORY  The patient's mother has a history of migraines and diabetes. Her maternal grandmother also had diabetes.    ALLERGIES  The patient is allergic to PENICILLINS.    MEDICATIONS  Current: Venlafaxine, semaglutide, Ambien  Discontinued: Topiramate, hydroxyzine    IMMUNIZATIONS  The patient has received three doses of the COVID-19 vaccine.              Health Maintenance     - Dyslipidemia (30-45): ordered  - Diabetes (HTN, HLD, BMI >25): ordered  - Depression screening (PHQ-2 and/or PHQ-9): neg  - Dental: UTD  - Eye: UTD  Diet: regular  Exercise: gym 3x per week  Substance Use: sober 2016  Tobacco Use/counseling: quit  Safe in relationship: yes       Cancer screening  - Cervical CA (21-65): follows with OBGYN  -  HX Abnormal pap/HPV: none  - Breast CA: mammo (required 50-73yo) or starting 40 (ACOG, ACR), 45 (ACS), 50 (USPTF): prev ordered  - Skin cancer screening: declines     Infectious disease screening/Immunizations  --STI/HIV Screening: completed  --Practices safe sex.  --Hepatitis C Screening (18 to 80 yo): completed  --Immunizations: tdap given today    Current Outpatient Medications Ordered in Epic   Medication Sig Dispense Refill    venlafaxine (EFFEXOR) 37.5 MG Tab Take 37.5 mg by mouth 3 times a day.      zolpidem (AMBIEN) 5 MG Tab Take 5 mg by mouth at bedtime as needed for Sleep.      topiramate (TOPAMAX) 200 MG tablet Take 200 mg by mouth at bedtime. (Patient not taking: Reported on 2/14/2025)       No current [facility]-ordered facility-administered medications on file.       ROS:   Gen: no fevers/chills, no changes in weight  Eyes: no  "changes in vision  ENT: no sore throat, no hearing loss, no bloody nose  Pulm: no sob, no cough  CV: no chest pain, no palpitations  GI: no nausea/vomiting, no diarrhea  : no dysuria  MSk: no myalgias  Skin: no rash  Neuro: no headaches, no numbness/tingling  Heme/Lymph: no easy bruising      Objective:       Exam: /70 (BP Location: Left arm, Patient Position: Sitting, BP Cuff Size: Adult)   Pulse 73   Temp 36.3 °C (97.4 °F) (Temporal)   Resp 16   Ht 1.702 m (5' 7\")   Wt 79.3 kg (174 lb 13.2 oz)   SpO2 100%  Body mass index is 27.38 kg/m².    General: Normal appearing. No distress.  HEENT: Normocephalic. Eyes conjunctiva clear lids without ptosis, pupils equal and reactive to light accommodation, ears normal shape and contour, canals are clear bilaterally, tympanic membranes are benign, nasal mucosa benign, oropharynx is without erythema, edema or exudates.   Neck: Supple without JVD or bruit. Thyroid is not enlarged.  Pulmonary: Clear to ausculation.  Normal effort. No rales, ronchi, or wheezing.  Cardiovascular: Regular rate and rhythm without murmur. Carotid and radial pulses are intact and equal bilaterally.  Abdomen: Soft, nontender, nondistended. Normal bowel sounds. Liver and spleen are not palpable  Neurologic: Grossly nonfocal  Lymph: No cervical or supraclavicular lymph nodes are palpable  Skin: Warm and dry.  No obvious lesions.  Musculoskeletal: Normal gait. No extremity cyanosis, clubbing, or edema.  Psych: Normal mood and affect. Alert and oriented x3. Judgment and insight is normal.      Assessment & Plan:   39 y.o. female with the following -    1. Encounter to establish care  Her last Tdap vaccine was administered in 2013. She has received 3 doses of the COVID-19 vaccine. She had chickenpox as a child. She is unsure about her hepatitis B vaccination status. She has a history of abnormal Pap smear in 2020, which was HPV positive, and has recently had another Pap smear. She is under the " care of an OB-GYN. She is up to date with dental and eye exams. She has a referral for a mammogram. She has never undergone skin cancer screening. The Tdap vaccine will be administered today. A lab order for hepatitis B titers will be placed to assess her immunity status. If she is not immune, the hepatitis B vaccine series will be recommended. A referral for a mammogram will be provided. A skin cancer screening will be scheduled.    2. Bipolar disorder, in full remission, most recent episode mixed (HCC)  She reports that her current medication regimen is not effective, and she is experiencing symptoms of bipolar disorder. A referral to a new psychiatrist will be initiated to reassess and potentially adjust her treatment plan.  - Referral to Behavioral Health    3. Need for vaccination  - Tdap =>8yo IM    4. Immunity status testing  - HEP B CORE AB TOTAL; Future  - HEP B SURFACE ANTIGEN; Future  - HEP B SURFACE AB; Future      5. Post-nasal drip  The recurrent throat clearing is likely due to postnasal drip, as evidenced by sinus secretion and drainage. There is also mild cobblestoning, indicating irritation in the back of the throat. Conservative measures such as antihistamines (Claritin, Zyrtec, Allegra), nasal steroid sprays, and saline nasal rinses are recommended to reduce mucus gland activity. If these measures are ineffective, a 2-week course of over-the-counter omeprazole may be considered.        I spent a total of 25 minutes with record review, exam, communication with the patient, communication with other providers, and documentation of this encounter.    No follow-ups on file.    Please note that this dictation was created using voice recognition software. I have made every reasonable attempt to correct obvious errors, but I expect that there are errors of grammar and possibly content that I did not discover before finalizing the note.

## 2025-05-30 ENCOUNTER — TELEPHONE (OUTPATIENT)
Dept: FAMILY PLANNING/WOMEN'S HEALTH CLINIC | Facility: PHYSICIAN GROUP | Age: 40
End: 2025-05-30
Payer: COMMERCIAL